# Patient Record
Sex: MALE | Race: WHITE | NOT HISPANIC OR LATINO | Employment: UNEMPLOYED | URBAN - METROPOLITAN AREA
[De-identification: names, ages, dates, MRNs, and addresses within clinical notes are randomized per-mention and may not be internally consistent; named-entity substitution may affect disease eponyms.]

---

## 2024-01-24 ENCOUNTER — APPOINTMENT (OUTPATIENT)
Dept: CARDIOLOGY | Facility: HOSPITAL | Age: 39
End: 2024-01-24
Payer: MEDICAID

## 2024-01-24 ENCOUNTER — HOSPITAL ENCOUNTER (EMERGENCY)
Facility: HOSPITAL | Age: 39
Discharge: PSYCHIATRIC OR SUBSTANCE USE FACILITY | End: 2024-01-27
Attending: EMERGENCY MEDICINE
Payer: MEDICAID

## 2024-01-24 DIAGNOSIS — F23 ACUTE PSYCHOSIS (MULTI): Primary | ICD-10-CM

## 2024-01-24 LAB
ALBUMIN SERPL BCP-MCNC: 4.2 G/DL (ref 3.4–5)
ALP SERPL-CCNC: 61 U/L (ref 33–120)
ALT SERPL W P-5'-P-CCNC: 9 U/L (ref 10–52)
AMPHETAMINES UR QL SCN: ABNORMAL
ANION GAP SERPL CALC-SCNC: 12 MMOL/L (ref 10–20)
APAP SERPL-MCNC: <10 UG/ML
AST SERPL W P-5'-P-CCNC: 15 U/L (ref 9–39)
BARBITURATES UR QL SCN: ABNORMAL
BASOPHILS # BLD AUTO: 0.04 X10*3/UL (ref 0–0.1)
BASOPHILS NFR BLD AUTO: 0.5 %
BENZODIAZ UR QL SCN: ABNORMAL
BILIRUB SERPL-MCNC: 0.5 MG/DL (ref 0–1.2)
BUN SERPL-MCNC: 20 MG/DL (ref 6–23)
BZE UR QL SCN: ABNORMAL
CALCIUM SERPL-MCNC: 8.8 MG/DL (ref 8.6–10.3)
CANNABINOIDS UR QL SCN: ABNORMAL
CHLORIDE SERPL-SCNC: 99 MMOL/L (ref 98–107)
CO2 SERPL-SCNC: 27 MMOL/L (ref 21–32)
CREAT SERPL-MCNC: 0.81 MG/DL (ref 0.5–1.3)
EGFRCR SERPLBLD CKD-EPI 2021: >90 ML/MIN/1.73M*2
EOSINOPHIL # BLD AUTO: 0.1 X10*3/UL (ref 0–0.7)
EOSINOPHIL NFR BLD AUTO: 1.4 %
ERYTHROCYTE [DISTWIDTH] IN BLOOD BY AUTOMATED COUNT: 13.5 % (ref 11.5–14.5)
ETHANOL SERPL-MCNC: <10 MG/DL
FENTANYL+NORFENTANYL UR QL SCN: ABNORMAL
GLUCOSE SERPL-MCNC: 122 MG/DL (ref 74–99)
HCT VFR BLD AUTO: 42.6 % (ref 41–52)
HGB BLD-MCNC: 14 G/DL (ref 13.5–17.5)
IMM GRANULOCYTES # BLD AUTO: 0.02 X10*3/UL (ref 0–0.7)
IMM GRANULOCYTES NFR BLD AUTO: 0.3 % (ref 0–0.9)
LYMPHOCYTES # BLD AUTO: 1.64 X10*3/UL (ref 1.2–4.8)
LYMPHOCYTES NFR BLD AUTO: 22.3 %
MCH RBC QN AUTO: 31 PG (ref 26–34)
MCHC RBC AUTO-ENTMCNC: 32.9 G/DL (ref 32–36)
MCV RBC AUTO: 95 FL (ref 80–100)
MONOCYTES # BLD AUTO: 0.7 X10*3/UL (ref 0.1–1)
MONOCYTES NFR BLD AUTO: 9.5 %
NEUTROPHILS # BLD AUTO: 4.85 X10*3/UL (ref 1.2–7.7)
NEUTROPHILS NFR BLD AUTO: 66 %
NRBC BLD-RTO: 0 /100 WBCS (ref 0–0)
OPIATES UR QL SCN: ABNORMAL
OXYCODONE+OXYMORPHONE UR QL SCN: ABNORMAL
PCP UR QL SCN: ABNORMAL
PLATELET # BLD AUTO: 319 X10*3/UL (ref 150–450)
POTASSIUM SERPL-SCNC: 4 MMOL/L (ref 3.5–5.3)
PROT SERPL-MCNC: 6.8 G/DL (ref 6.4–8.2)
RBC # BLD AUTO: 4.51 X10*6/UL (ref 4.5–5.9)
SALICYLATES SERPL-MCNC: <3 MG/DL
SARS-COV-2 RNA RESP QL NAA+PROBE: NOT DETECTED
SODIUM SERPL-SCNC: 134 MMOL/L (ref 136–145)
TSH SERPL-ACNC: 0.62 MIU/L (ref 0.44–3.98)
WBC # BLD AUTO: 7.4 X10*3/UL (ref 4.4–11.3)

## 2024-01-24 PROCEDURE — 87502 INFLUENZA DNA AMP PROBE: CPT | Performed by: EMERGENCY MEDICINE

## 2024-01-24 PROCEDURE — 84443 ASSAY THYROID STIM HORMONE: CPT | Performed by: EMERGENCY MEDICINE

## 2024-01-24 PROCEDURE — 80143 DRUG ASSAY ACETAMINOPHEN: CPT | Performed by: EMERGENCY MEDICINE

## 2024-01-24 PROCEDURE — 85025 COMPLETE CBC W/AUTO DIFF WBC: CPT | Performed by: EMERGENCY MEDICINE

## 2024-01-24 PROCEDURE — 80053 COMPREHEN METABOLIC PANEL: CPT | Performed by: EMERGENCY MEDICINE

## 2024-01-24 PROCEDURE — 36415 COLL VENOUS BLD VENIPUNCTURE: CPT | Performed by: EMERGENCY MEDICINE

## 2024-01-24 PROCEDURE — 2500000002 HC RX 250 W HCPCS SELF ADMINISTERED DRUGS (ALT 637 FOR MEDICARE OP, ALT 636 FOR OP/ED): Performed by: EMERGENCY MEDICINE

## 2024-01-24 PROCEDURE — 99285 EMERGENCY DEPT VISIT HI MDM: CPT | Performed by: EMERGENCY MEDICINE

## 2024-01-24 PROCEDURE — S4991 NICOTINE PATCH NONLEGEND: HCPCS | Performed by: EMERGENCY MEDICINE

## 2024-01-24 PROCEDURE — 80307 DRUG TEST PRSMV CHEM ANLYZR: CPT | Performed by: EMERGENCY MEDICINE

## 2024-01-24 PROCEDURE — 93005 ELECTROCARDIOGRAM TRACING: CPT

## 2024-01-24 PROCEDURE — 87635 SARS-COV-2 COVID-19 AMP PRB: CPT | Performed by: EMERGENCY MEDICINE

## 2024-01-24 RX ORDER — IBUPROFEN 200 MG
1 TABLET ORAL DAILY
Status: DISCONTINUED | OUTPATIENT
Start: 2024-01-24 | End: 2024-01-27 | Stop reason: HOSPADM

## 2024-01-24 RX ADMIN — NICOTINE 1 PATCH: 14 PATCH, EXTENDED RELEASE TRANSDERMAL at 21:09

## 2024-01-24 ASSESSMENT — COLUMBIA-SUICIDE SEVERITY RATING SCALE - C-SSRS
6. HAVE YOU EVER DONE ANYTHING, STARTED TO DO ANYTHING, OR PREPARED TO DO ANYTHING TO END YOUR LIFE?: NO
1. IN THE PAST MONTH, HAVE YOU WISHED YOU WERE DEAD OR WISHED YOU COULD GO TO SLEEP AND NOT WAKE UP?: NO
2. HAVE YOU ACTUALLY HAD ANY THOUGHTS OF KILLING YOURSELF?: NO

## 2024-01-24 ASSESSMENT — PAIN - FUNCTIONAL ASSESSMENT: PAIN_FUNCTIONAL_ASSESSMENT: 0-10
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
## 2024-01-24 NOTE — ED PROVIDER NOTES
HPI   Chief Complaint   Patient presents with   • Psychiatric Evaluation       Patient presents for a psychiatric evaluation.  He states that he believes he needs a psychiatric evaluation.  Significant other is here and is concerned because he believes that his family is out to get him.  He is very paranoid about this.  The police have been to the house multiple times because he calls them.  He has not noted any specific threats.  He had an intake evaluation at [place] but no other psychiatric evaluations.  He is on anxiety medications and Adderall at home.  He denies being suicidal.  He denies any auditory or visual hallucinations just the paranoia.  He admits to drinking alcohol and smoking marijuana but denies any methamphetamine or any other hard drug use.                          [place] Coma Scale Score: 15                  Patient History   No past medical history on file.  No past surgical history on file.  No family history on file.  Social History     Tobacco Use   • Smoking status: Not on file   • Smokeless tobacco: Not on file   Substance Use Topics   • Alcohol use: Not on file   • Drug use: Not on file       Physical Exam   ED Triage Vitals [01/24/24 1158]   Temperature Heart Rate Respirations BP   36.4 °C (97.6 °F) 96 18 123/85      Pulse Ox Temp src Heart Rate Source Patient Position   99 % -- -- Lying      BP Location FiO2 (%)     -- --       Physical Exam  Vitals and nursing note reviewed.   Constitutional:       Appearance: Normal appearance.   HENT:      Head: Normocephalic and atraumatic.      Mouth/Throat:      Mouth: Mucous membranes are moist.   Eyes:      Extraocular Movements: Extraocular movements intact.      Pupils: Pupils are equal, round, and reactive to light.   Cardiovascular:      Rate and Rhythm: Normal rate and regular rhythm.      Heart sounds: No murmur heard.  Pulmonary:      Effort: Pulmonary effort is normal. No respiratory distress.      Breath sounds: Normal breath sounds.    Abdominal:      General: There is no distension.      Palpations: Abdomen is soft.      Tenderness: There is no abdominal tenderness.   Musculoskeletal:         General: No deformity. Normal range of motion.   Skin:     General: Skin is warm and dry.      Findings: No lesion or rash.   Neurological:      General: No focal deficit present.      Mental Status: He is alert and oriented to person, place, and time.      Sensory: No sensory deficit.      Motor: No weakness.   Psychiatric:         Thought Content: Thought content is paranoid. Thought content does not include homicidal or suicidal ideation.       Labs Reviewed - No data to display  No orders to display     ED Course & MDM   Diagnoses as of 01/24/24 1932   Acute psychosis (CMS/[facility])       Medical Decision Making  Differentials include psychiatric disease, metabolic issue, drug-induced.  Imaging studies, if performed, were independently reviewed and interpreted by myself and confirmed by radiologist. EKG(s), if performed, were interpreted by myself. The patient had a screening EKG that was sinus rhythm at a rate of 84.  No acute ischemia.  QTc 434, ID interval 123.  Screening labs show sodium of 134, otherwise unremarkable.  Toxicology showed amphetamines and cannabinoids.  The patient is medically cleared for psychiatric evaluation and admission.  Patient was evaluated by the [facility] evaluator and we both feel the patient requires inpatient stabilization.  He will likely need to go to [facility] due to not having insurance.  We are still awaiting acceptance for there.        Procedure  Procedures     Yeyo Rowe MD  01/24/24 1932
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
## 2024-01-25 LAB
AMORPH CRY #/AREA UR COMP ASSIST: ABNORMAL /HPF
APPEARANCE UR: ABNORMAL
BACTERIA #/AREA URNS AUTO: ABNORMAL /HPF
BILIRUB UR STRIP.AUTO-MCNC: NEGATIVE MG/DL
COLOR UR: YELLOW
GLUCOSE UR STRIP.AUTO-MCNC: NEGATIVE MG/DL
KETONES UR STRIP.AUTO-MCNC: NEGATIVE MG/DL
LEUKOCYTE ESTERASE UR QL STRIP.AUTO: ABNORMAL
MUCOUS THREADS #/AREA URNS AUTO: ABNORMAL /LPF
NITRITE UR QL STRIP.AUTO: NEGATIVE
PH UR STRIP.AUTO: 8 [PH]
PROT UR STRIP.AUTO-MCNC: NEGATIVE MG/DL
RBC # UR STRIP.AUTO: NEGATIVE /UL
RBC #/AREA URNS AUTO: ABNORMAL /HPF
SP GR UR STRIP.AUTO: 1.01
SQUAMOUS #/AREA URNS AUTO: ABNORMAL /HPF
UROBILINOGEN UR STRIP.AUTO-MCNC: <2 MG/DL
WBC #/AREA URNS AUTO: ABNORMAL /HPF

## 2024-01-25 PROCEDURE — 81001 URINALYSIS AUTO W/SCOPE: CPT | Performed by: STUDENT IN AN ORGANIZED HEALTH CARE EDUCATION/TRAINING PROGRAM

## 2024-01-25 PROCEDURE — 2500000001 HC RX 250 WO HCPCS SELF ADMINISTERED DRUGS (ALT 637 FOR MEDICARE OP): Performed by: STUDENT IN AN ORGANIZED HEALTH CARE EDUCATION/TRAINING PROGRAM

## 2024-01-25 PROCEDURE — S4991 NICOTINE PATCH NONLEGEND: HCPCS | Performed by: EMERGENCY MEDICINE

## 2024-01-25 PROCEDURE — 2500000002 HC RX 250 W HCPCS SELF ADMINISTERED DRUGS (ALT 637 FOR MEDICARE OP, ALT 636 FOR OP/ED): Performed by: EMERGENCY MEDICINE

## 2024-01-25 RX ORDER — LEVOTHYROXINE SODIUM 50 UG/1
50 TABLET ORAL DAILY
Status: DISCONTINUED | OUTPATIENT
Start: 2024-01-25 | End: 2024-01-27 | Stop reason: HOSPADM

## 2024-01-25 RX ORDER — LISINOPRIL 10 MG/1
10 TABLET ORAL DAILY
Status: DISCONTINUED | OUTPATIENT
Start: 2024-01-25 | End: 2024-01-27 | Stop reason: HOSPADM

## 2024-01-25 RX ORDER — FLUOXETINE HYDROCHLORIDE 20 MG/1
20 CAPSULE ORAL
COMMUNITY
Start: 2024-01-05 | End: 2024-01-31 | Stop reason: HOSPADM

## 2024-01-25 RX ORDER — LEVOTHYROXINE SODIUM 50 UG/1
50 TABLET ORAL
COMMUNITY
Start: 2023-04-28 | End: 2024-01-31 | Stop reason: HOSPADM

## 2024-01-25 RX ORDER — FLUOXETINE HYDROCHLORIDE 20 MG/1
20 CAPSULE ORAL DAILY
Status: DISCONTINUED | OUTPATIENT
Start: 2024-01-25 | End: 2024-01-27 | Stop reason: HOSPADM

## 2024-01-25 RX ORDER — TRAZODONE HYDROCHLORIDE 50 MG/1
25 TABLET ORAL NIGHTLY
Status: DISCONTINUED | OUTPATIENT
Start: 2024-01-25 | End: 2024-01-27 | Stop reason: DRUGHIGH

## 2024-01-25 RX ORDER — LISINOPRIL 10 MG/1
10 TABLET ORAL
COMMUNITY
Start: 2023-04-28 | End: 2024-01-31 | Stop reason: HOSPADM

## 2024-01-25 RX ORDER — DEXTROAMPHETAMINE SACCHARATE, AMPHETAMINE ASPARTATE, DEXTROAMPHETAMINE SULFATE AND AMPHETAMINE SULFATE 7.5; 7.5; 7.5; 7.5 MG/1; MG/1; MG/1; MG/1
30 TABLET ORAL 2 TIMES DAILY
COMMUNITY
Start: 2019-03-11 | End: 2024-01-31 | Stop reason: HOSPADM

## 2024-01-25 RX ADMIN — FLUOXETINE HYDROCHLORIDE 20 MG: 20 CAPSULE ORAL at 16:06

## 2024-01-25 RX ADMIN — LEVOTHYROXINE SODIUM 50 MCG: 50 TABLET ORAL at 16:07

## 2024-01-25 RX ADMIN — NICOTINE 1 PATCH: 14 PATCH, EXTENDED RELEASE TRANSDERMAL at 12:49

## 2024-01-25 RX ADMIN — TRAZODONE HYDROCHLORIDE 25 MG: 50 TABLET ORAL at 20:16

## 2024-01-25 RX ADMIN — LISINOPRIL 10 MG: 10 TABLET ORAL at 16:07

## 2024-01-25 SDOH — HEALTH STABILITY: MENTAL HEALTH: BEHAVIORS/MOOD: COOPERATIVE;CALM

## 2024-01-25 SDOH — HEALTH STABILITY: MENTAL HEALTH: BEHAVIORS/MOOD: APPROPRIATE FOR AGE;APPROPRIATE FOR SITUATION

## 2024-01-25 SDOH — HEALTH STABILITY: MENTAL HEALTH: BEHAVIORS/MOOD: CALM;COOPERATIVE

## 2024-01-25 SDOH — HEALTH STABILITY: MENTAL HEALTH: BEHAVIORS/MOOD: CALM

## 2024-01-25 SDOH — HEALTH STABILITY: MENTAL HEALTH: CONTENT: UNREMARKABLE

## 2024-01-25 SDOH — HEALTH STABILITY: MENTAL HEALTH: SLEEP PATTERN: DIFFICULTY FALLING ASLEEP

## 2024-01-25 SDOH — HEALTH STABILITY: MENTAL HEALTH

## 2024-01-25 SDOH — HEALTH STABILITY: MENTAL HEALTH: BEHAVIORS/MOOD: COOPERATIVE;CALM;MANIPULATIVE

## 2024-01-25 ASSESSMENT — COLUMBIA-SUICIDE SEVERITY RATING SCALE - C-SSRS
6. HAVE YOU EVER DONE ANYTHING, STARTED TO DO ANYTHING, OR PREPARED TO DO ANYTHING TO END YOUR LIFE?: NO
1. SINCE LAST CONTACT, HAVE YOU WISHED YOU WERE DEAD OR WISHED YOU COULD GO TO SLEEP AND NOT WAKE UP?: NO
2. HAVE YOU ACTUALLY HAD ANY THOUGHTS OF KILLING YOURSELF?: NO

## 2024-01-26 PROCEDURE — 2500000001 HC RX 250 WO HCPCS SELF ADMINISTERED DRUGS (ALT 637 FOR MEDICARE OP): Performed by: STUDENT IN AN ORGANIZED HEALTH CARE EDUCATION/TRAINING PROGRAM

## 2024-01-26 PROCEDURE — 2500000002 HC RX 250 W HCPCS SELF ADMINISTERED DRUGS (ALT 637 FOR MEDICARE OP, ALT 636 FOR OP/ED): Performed by: EMERGENCY MEDICINE

## 2024-01-26 PROCEDURE — 99222 1ST HOSP IP/OBS MODERATE 55: CPT

## 2024-01-26 PROCEDURE — S4991 NICOTINE PATCH NONLEGEND: HCPCS | Performed by: EMERGENCY MEDICINE

## 2024-01-26 RX ADMIN — LISINOPRIL 10 MG: 10 TABLET ORAL at 09:03

## 2024-01-26 RX ADMIN — NICOTINE 1 PATCH: 14 PATCH, EXTENDED RELEASE TRANSDERMAL at 09:04

## 2024-01-26 RX ADMIN — LEVOTHYROXINE SODIUM 50 MCG: 50 TABLET ORAL at 09:03

## 2024-01-26 RX ADMIN — TRAZODONE HYDROCHLORIDE 25 MG: 50 TABLET ORAL at 21:22

## 2024-01-26 RX ADMIN — FLUOXETINE HYDROCHLORIDE 20 MG: 20 CAPSULE ORAL at 09:03

## 2024-01-26 SDOH — HEALTH STABILITY: MENTAL HEALTH: BEHAVIORS/MOOD: CALM;COOPERATIVE

## 2024-01-26 SDOH — HEALTH STABILITY: MENTAL HEALTH: CONTENT: UNREMARKABLE

## 2024-01-26 SDOH — HEALTH STABILITY: MENTAL HEALTH

## 2024-01-26 SDOH — HEALTH STABILITY: MENTAL HEALTH: BEHAVIORS/MOOD: CALM;SLEEPING

## 2024-01-26 SDOH — HEALTH STABILITY: MENTAL HEALTH: BEHAVIORS/MOOD: SLEEPING

## 2024-01-26 SDOH — SOCIAL STABILITY: SOCIAL INSECURITY: FAMILY BEHAVIORS: CALM

## 2024-01-26 ASSESSMENT — LIFESTYLE VARIABLES
HAVE PEOPLE ANNOYED YOU BY CRITICIZING YOUR DRINKING: NO
EVER HAD A DRINK FIRST THING IN THE MORNING TO STEADY YOUR NERVES TO GET RID OF A HANGOVER: NO
REASON UNABLE TO ASSESS: NO
EVER FELT BAD OR GUILTY ABOUT YOUR DRINKING: NO
HAVE YOU EVER FELT YOU SHOULD CUT DOWN ON YOUR DRINKING: NO

## 2024-01-26 NOTE — PROGRESS NOTES
I received patient in signout in summary this is a 38-year-old male I have accept care of this patient in signout.    In summary:  I have seen the patient in signout this is a 38-year-old male past medical history of schizophrenia who is presenting for psychosis medically cleared and is pending placement.      Labs Reviewed   COMPREHENSIVE METABOLIC PANEL - Abnormal       Result Value    Glucose 122 (*)     Sodium 134 (*)     Potassium 4.0      Chloride 99      Bicarbonate 27      Anion Gap 12      Urea Nitrogen 20      Creatinine 0.81      eGFR >90      Calcium 8.8      Albumin 4.2      Alkaline Phosphatase 61      Total Protein 6.8      AST 15      Bilirubin, Total 0.5      ALT 9 (*)    DRUG SCREEN,URINE - Abnormal    Amphetamine Screen, Urine Presumptive Positive (*)     Barbiturate Screen, Urine Presumptive Negative      Benzodiazepines Screen, Urine Presumptive Negative      Cannabinoid Screen, Urine Presumptive Positive (*)     Cocaine Metabolite Screen, Urine Presumptive Negative      Fentanyl Screen, Urine Presumptive Negative      Opiate Screen, Urine Presumptive Negative      Oxycodone Screen, Urine Presumptive Negative      PCP Screen, Urine Presumptive Negative      Narrative:     Drug screen results are presumptive and should not be used to assess   compliance with prescribed medication. Contact the performing [facility] laboratory   to add-on definitive confirmatory testing if clinically indicated.    Toxicology screening results are reported qualitatively. The concentration must   be greater than or equal to the cutoff to be reported as positive. The concentration   at which the screening test can detect an individual drug or metabolite varies.   The absence of expected drug(s) and/or drug metabolite(s) may indicate non-compliance,   inappropriate timing of specimen collection relative to drug administration, poor drug   absorption, diluted/adulterated urine, or limitations of testing. For medical purposes    only; not valid for forensic use.    Interpretive questions should be directed to the laboratory medical directors.   URINALYSIS WITH REFLEX MICROSCOPIC - Abnormal    Color, Urine Yellow      Appearance, Urine Hazy (*)     Specific Gravity, Urine 1.014      pH, Urine 8.0      Protein, Urine NEGATIVE      Glucose, Urine NEGATIVE      Blood, Urine NEGATIVE      Ketones, Urine NEGATIVE      Bilirubin, Urine NEGATIVE      Urobilinogen, Urine <2.0      Nitrite, Urine NEGATIVE      Leukocyte Esterase, Urine TRACE (*)    MICROSCOPIC ONLY, URINE - Abnormal    WBC, Urine 21-50 (*)     RBC, Urine NONE      Squamous Epithelial Cells, Urine 1-9 (SPARSE)      Bacteria, Urine 1+ (*)     Mucus, Urine 1+      Amorphous Crystals, Urine 2+     ACUTE TOXICOLOGY PANEL, BLOOD - Normal    Acetaminophen <10.0      Salicylate  <3      Alcohol <10     TSH WITH REFLEX TO FREE T4 IF ABNORMAL - Normal    Thyroid Stimulating Hormone 0.62      Narrative:     TSH testing is performed using different testing methodology at [facility] [place] [facility] than at [facility]. Direct result comparisons should only be made within the same method.     SARS-COV-2 PCR, SCREEN ASYMPTOMATIC - Normal    Coronavirus 2019, PCR Not Detected      Narrative:     This assay has received FDA Emergency Use Authorization (EUA) and is only authorized for the duration of time that circumstances exist to justify the authorization of the emergency use of in vitro diagnostic tests for the detection of SARS-CoV-2 virus and/or diagnosis of COVID-19 infection under section 564(b)(1) of the Act, 21 U.S.C. 360bbb-3(b)(1). This assay is an in vitro diagnostic nucleic acid amplification test for the qualitative detection of SARS-CoV-2 from nasopharyngeal specimens and has been validated for use at [facility]. Negative results do not preclude COVID-19 infections and should not be used as the sole basis for diagnosis, treatment, or other management  decisions.     CBC WITH AUTO DIFFERENTIAL    WBC 7.4      nRBC 0.0      RBC 4.51      Hemoglobin 14.0      Hematocrit 42.6      MCV 95      MCH 31.0      MCHC 32.9      RDW 13.5      Platelets 319      Neutrophils % 66.0      Immature Granulocytes %, Automated 0.3      Lymphocytes % 22.3      Monocytes % 9.5      Eosinophils % 1.4      Basophils % 0.5      Neutrophils Absolute 4.85      Immature Granulocytes Absolute, Automated 0.02      Lymphocytes Absolute 1.64      Monocytes Absolute 0.70      Eosinophils Absolute 0.10      Basophils Absolute 0.04       No orders to display

## 2024-01-26 NOTE — PROGRESS NOTES
.  Patient was signed over to me by the outgoing physician.  Plan was for him to be going to [facility] but was 11 on the list the patient while here got his insurance sorted out and now has more options to be able to be admitted to other inpatient psychiatric facilities he therefore is pending EPAT evaluation with plans for inpatient psychiatric placement he has been stable during my shift and has not required any acute interventions

## 2024-01-26 NOTE — CONSULTS
Consults  Referring Provider  Yeyo Rowe MD    History Of Present Illness  Teo Acevedo is a 38 y.o. male presenting with c/c of wanting a psychiatric consult due to believing that his family is out to get him. UDS (+) cannabis and (+) amphetamines, BAL < 10mg/dL.     Past Medical History  Per chart ADD  Per pt depression    Surgical History  He has no past surgical history on file.     Social History  Has a history of alcohol use and marijuana use.     Past Psychiatric History     Prior psychiatric hospitalizations: Denies  Prior rehab/detox: Denies  History of suicide attempts: Denies history of attempts; endorses passive SI without a plan  History of self-harm: Denies  History of trauma/abuse/loss: Denies  History of violence: Denies    Current psychiatric medications: Adderall 30mg/day for ADD, Prozac 20mg/day for depression and anxiety  Past psychiatric medications: Zoloft, unknown dose, in his 20's for depression, Trazadone 25mg for sleep    Family psychiatric history:     - Psychiatric disorders: unknown, pt states his family views mental health as a weakness     - Suicide: denies     - Substance use: pt is unaware of family history of substance use    Current living situation: lives alone in a home  Current employment/source of income:  currently unemployed  Born and raised: [facility]   Education: attended college but did not graduate  Legal history: denies  Access to weapons: 2 guns in the home per [place] Report    Allergies  Patient has no known allergies.    Review of Systems    Psychiatric ROS - Adult  Anxiety: General Anxiety Disorder (KENNEDI)KENNEDI Behaviors: difficult to control worry, excessive anxiety/worry, irritability, restlessness, and sleep disturbance  Depression: energy, sleep decreased , and suicidal thoughts  Delirium: negative  Psychosis: negative  Yecenia: negative  Safety Issues: thoughts of harm to others and passive death wish  Psychiatric ROS Comment: Pt denies HI, per chart from  "[facility] obtaining collateral from the girlfriend she expressed concerns for HI towards his family    Physical Exam    Mental Status Exam  General: 39 y/o  male, dressed in a hospital gown and sitting on a hospital cot  Appearance: pt appears to be stated age   Attitude: calm and cooperative, appears to be guarded   Behavior: maintains eye contact intermittently   Motor Activity: no PMAR/TD/EPS observed. Gait not assessed.  Speech: pt is speaking in a low tone/volume and slow rate/rhythm   Mood: \"I feel like I've been depressed\"   Affect: hypothymic and flat   Thought Process: linear, organized   Thought Content: Denies HI. Expresses SI as a fleeting thought with no plan or past attempts. (+) paranoid delusions of family members \"out to get him\"  Thought Perception: Denies AVH. Does not appear to be responding to internal stimuli.   Cognition: Alert and orientated x3. Recent memory appears to be intact.  Insight: fair  Judgement: poor; pt appears to be minimizing the situation by denying HI towards family members. Recent coping skills included \"drinking until I blacked out\".    Psychiatric Risk Assessment  Violence Risk Assessment: per collateral clearly identified target, homicidal ideation, and male  Acute Risk of Harm to Others is Considered: moderate and high   Suicide Risk Assessment: , living alone or lack of social support, male, suicidal ideations, and unmarried  Protective Factors against Suicide: adherence to  treatment and fear of suicide  Acute Risk of Harm to Self is Considered: moderate/high    Last Recorded Vitals  Blood pressure 137/88, pulse 78, temperature 36.8 °C (98.3 °F), temperature source Temporal, resp. rate 16, height 1.829 m (6'), weight 72.6 kg (160 lb), SpO2 98 %.    Relevant Results    Scheduled medications  FLUoxetine, 20 mg, oral, Daily  levothyroxine, 50 mcg, oral, Daily  lisinopril, 10 mg, oral, Daily  nicotine, 1 patch, transdermal, Daily  traZODone, 25 mg, oral, " Nightly      Continuous medications     PRN medications  Results for orders placed or performed during the hospital encounter of 01/24/24 (from the past 96 hour(s))   Electrocardiogram, 12-lead   Result Value Ref Range    Ventricular Rate 84 BPM    Atrial Rate 84 BPM    OK Interval 123 ms    QRS Duration 87 ms    QT Interval 367 ms    QTC Calculation(Bazett) 434 ms    P Axis 61 degrees    R Axis 80 degrees    T Axis 73 degrees    QRS Count 14 beats    Q Onset 252 ms    T Offset 436 ms    QTC Fredericia 410 ms   CBC and Auto Differential   Result Value Ref Range    WBC 7.4 4.4 - 11.3 x10*3/uL    nRBC 0.0 0.0 - 0.0 /100 WBCs    RBC 4.51 4.50 - 5.90 x10*6/uL    Hemoglobin 14.0 13.5 - 17.5 g/dL    Hematocrit 42.6 41.0 - 52.0 %    MCV 95 80 - 100 fL    MCH 31.0 26.0 - 34.0 pg    MCHC 32.9 32.0 - 36.0 g/dL    RDW 13.5 11.5 - 14.5 %    Platelets 319 150 - 450 x10*3/uL    Neutrophils % 66.0 40.0 - 80.0 %    Immature Granulocytes %, Automated 0.3 0.0 - 0.9 %    Lymphocytes % 22.3 13.0 - 44.0 %    Monocytes % 9.5 2.0 - 10.0 %    Eosinophils % 1.4 0.0 - 6.0 %    Basophils % 0.5 0.0 - 2.0 %    Neutrophils Absolute 4.85 1.20 - 7.70 x10*3/uL    Immature Granulocytes Absolute, Automated 0.02 0.00 - 0.70 x10*3/uL    Lymphocytes Absolute 1.64 1.20 - 4.80 x10*3/uL    Monocytes Absolute 0.70 0.10 - 1.00 x10*3/uL    Eosinophils Absolute 0.10 0.00 - 0.70 x10*3/uL    Basophils Absolute 0.04 0.00 - 0.10 x10*3/uL   Comprehensive Metabolic Panel   Result Value Ref Range    Glucose 122 (H) 74 - 99 mg/dL    Sodium 134 (L) 136 - 145 mmol/L    Potassium 4.0 3.5 - 5.3 mmol/L    Chloride 99 98 - 107 mmol/L    Bicarbonate 27 21 - 32 mmol/L    Anion Gap 12 10 - 20 mmol/L    Urea Nitrogen 20 6 - 23 mg/dL    Creatinine 0.81 0.50 - 1.30 mg/dL    eGFR >90 >60 mL/min/1.73m*2    Calcium 8.8 8.6 - 10.3 mg/dL    Albumin 4.2 3.4 - 5.0 g/dL    Alkaline Phosphatase 61 33 - 120 U/L    Total Protein 6.8 6.4 - 8.2 g/dL    AST 15 9 - 39 U/L    Bilirubin, Total  0.5 0.0 - 1.2 mg/dL    ALT 9 (L) 10 - 52 U/L   Acute Toxicology Panel, Blood   Result Value Ref Range    Acetaminophen <10.0 10.0 - 30.0 ug/mL    Salicylate  <3 4 - 20 mg/dL    Alcohol <10 <=10 mg/dL   TSH with reflex to Free T4 if abnormal   Result Value Ref Range    Thyroid Stimulating Hormone 0.62 0.44 - 3.98 mIU/L   Drug Screen, Urine   Result Value Ref Range    Amphetamine Screen, Urine Presumptive Positive (A) Presumptive Negative    Barbiturate Screen, Urine Presumptive Negative Presumptive Negative    Benzodiazepines Screen, Urine Presumptive Negative Presumptive Negative    Cannabinoid Screen, Urine Presumptive Positive (A) Presumptive Negative    Cocaine Metabolite Screen, Urine Presumptive Negative Presumptive Negative    Fentanyl Screen, Urine Presumptive Negative Presumptive Negative    Opiate Screen, Urine Presumptive Negative Presumptive Negative    Oxycodone Screen, Urine Presumptive Negative Presumptive Negative    PCP Screen, Urine Presumptive Negative Presumptive Negative   Sars-CoV-2 PCR, Screen Asymptomatic   Result Value Ref Range    Coronavirus 2019, PCR Not Detected Not Detected   Urinalysis with Reflex Microscopic   Result Value Ref Range    Color, Urine Yellow Straw, Yellow    Appearance, Urine Hazy (N) Clear    Specific Gravity, Urine 1.014 1.005 - 1.035    pH, Urine 8.0 5.0, 5.5, 6.0, 6.5, 7.0, 7.5, 8.0    Protein, Urine NEGATIVE NEGATIVE mg/dL    Glucose, Urine NEGATIVE NEGATIVE mg/dL    Blood, Urine NEGATIVE NEGATIVE    Ketones, Urine NEGATIVE NEGATIVE mg/dL    Bilirubin, Urine NEGATIVE NEGATIVE    Urobilinogen, Urine <2.0 <2.0 mg/dL    Nitrite, Urine NEGATIVE NEGATIVE    Leukocyte Esterase, Urine TRACE (A) NEGATIVE   Microscopic Only, Urine   Result Value Ref Range    WBC, Urine 21-50 (A) 1-5, NONE /HPF    RBC, Urine NONE NONE, 1-2, 3-5 /HPF    Squamous Epithelial Cells, Urine 1-9 (SPARSE) Reference range not established. /HPF    Bacteria, Urine 1+ (A) NONE SEEN /HPF    Mucus, Urine 1+  "Reference range not established. /LPF    Amorphous Crystals, Urine 2+ NONE, 1+, 2+ /HPF        Assessment/Plan     Pt is able to confirm name/date of birth and able to consent for the interview.     Pt is sitting up right on the hospital cot wearing a hospital gown. He states that he came to the ED for a psychiatric evaluation and that his girlfriend was concerned for him. He states that last week he attempted to receiving mental health services through [facility] outpatient services due to concerns about his relationship with his family. At the time, he states he was not medically insured; therefore, he filed out paperwork at the outpatient service center and was advised to wait for [facility] to reach out with the next steps. During this waiting period, he explains that he started drinking to \"pass the time\". He explains that he has a drinking history and became sober in July of 2022. As of recently, he states that he is having some family dysfunction which has lead to him using drinking as a coping mechanism. He states he started drinking Saturday (1/20) when his girlfriend grew concerned. He states he was drinking \"to black out\" as a way to \"pass the time\". He denies getting angry or irritable with drinking. At that time, the girlfriend advised him to \"get some help\" and brought him into the ED at [facility].     At this time, the patient is denying HI. He states that the dysfunction with his family has been ongoing \"since I can remember\". He believes they use him as the scapegoat. In the last year, the patient explains that he attempted to work on his relationship with his family by attending family counseling. During the session, he states that issues became worse and that nothing was getting resolved. At that point, the patient felt that he should \"cut them out\" because \"it makes me depressed\". He denies having thoughts of harming his family members or others. He explains his current symptoms of depression as " "having trouble sleeping and his mood is \"down\". He states he was started on Prozac by his PCP recently and was increased to 20mg/day after a month of taking the medication. He believes this has helped with his sleep and \"feeling better\".  He also states that he was prescribed trazadone 25mg/PRN for sleep but it did not improve his sleep; therefore, he does not take it. He explains that although the medication was working, his family makes his depressed and \"I can't get away from it\".     Per [facility]'s evaluation, they were able to obtain collaterals from the pt's girlfriend in person at his time of admission to [facility] ED. In their reports, the gf expressed concern for the family members safety stating that he has sent multiple homicidal texts to his family including threats such as \"I want to slit your throat\". The girlfriend recalls that in July 2022 the pt \"sobered up\" causing the pt to have clear memories about past events with his family. She also explained that he is paranoid about his family, stating that in May 2023 the pt was fired from his job after starting an argument with his boss but the pt believed the family sabotaged him because \"they were upset that I quit drinking\". She also reported that he has \"fits of anger\" and gets agitated over inconsequential things.  The gf also states that the pt expressed that he is drinking in hopes that it will kill him.    The patient appears to be minimizing the current situation by denying current HI/SI and fixating on when he will be allowed to go home. At this time, he is considered a moderate/high risk of harm to others due to recent threats directed at this family members. The patient is also considered a moderate/high risk of harm to himself due to making suicidal statements to his gf. I believe his judgement is poor due to recent SI/HI and utilizing drinking as a coping skill.    Impression  Unspecified Mood Disorder    RECOMMENDATIONS  - patient DOES " "currently meet criteria for inpatient psychiatric hospitalization; EPAT working on placement  - Issue Application for Emergency Admission (pink slip) only after patient is accepted to an inpatient psychiatric unit and is ready to be discharged. Search “Application for Emergency Admission” under SmartText.”  - Patient lacks the capacity to leave AMA at this time and thus cannot leave AMA. Call [facility] if patient attempts to leave AMA.  - To evaluate decision-making capacity, recommend use of the Capacity Evaluation Tool. Search “ IP Capacity Evaluation under SmartText\" unless the patient has a legal guardian, in which case all decisions per the legal guardian.  - Patient DOES require a 1:1 sitter from a psychiatric perspective at this time.  - Would secure all personal possessions and keep clad in hospital gown    I discussed these recommendations with the current ED provider (Dr. Dinero) who was in agreement with above plan of care.    Medication Consent  Medication Consent: n/a; consult service    Connie Mathew        "

## 2024-01-27 ENCOUNTER — HOSPITAL ENCOUNTER (INPATIENT)
Facility: HOSPITAL | Age: 39
LOS: 4 days | Discharge: HOME | End: 2024-01-31
Attending: PSYCHIATRY & NEUROLOGY | Admitting: PSYCHIATRY & NEUROLOGY
Payer: MEDICAID

## 2024-01-27 VITALS
SYSTOLIC BLOOD PRESSURE: 125 MMHG | OXYGEN SATURATION: 97 % | HEART RATE: 75 BPM | TEMPERATURE: 98.2 F | BODY MASS INDEX: 21.67 KG/M2 | HEIGHT: 72 IN | WEIGHT: 160 LBS | RESPIRATION RATE: 16 BRPM | DIASTOLIC BLOOD PRESSURE: 88 MMHG

## 2024-01-27 DIAGNOSIS — F17.220 CHEWING TOBACCO NICOTINE DEPENDENCE WITHOUT COMPLICATION: ICD-10-CM

## 2024-01-27 DIAGNOSIS — I10 PRIMARY HYPERTENSION: ICD-10-CM

## 2024-01-27 DIAGNOSIS — E03.9 ACQUIRED HYPOTHYROIDISM: ICD-10-CM

## 2024-01-27 DIAGNOSIS — F32.9 REACTIVE DEPRESSION: ICD-10-CM

## 2024-01-27 DIAGNOSIS — F90.0 ATTENTION DEFICIT HYPERACTIVITY DISORDER (ADHD), PREDOMINANTLY INATTENTIVE TYPE: Primary | ICD-10-CM

## 2024-01-27 PROBLEM — F32.A DEPRESSION: Status: ACTIVE | Noted: 2024-01-27

## 2024-01-27 LAB
FLUAV RNA RESP QL NAA+PROBE: NOT DETECTED
FLUBV RNA RESP QL NAA+PROBE: NOT DETECTED

## 2024-01-27 PROCEDURE — S4991 NICOTINE PATCH NONLEGEND: HCPCS | Performed by: EMERGENCY MEDICINE

## 2024-01-27 PROCEDURE — 2500000001 HC RX 250 WO HCPCS SELF ADMINISTERED DRUGS (ALT 637 FOR MEDICARE OP): Performed by: STUDENT IN AN ORGANIZED HEALTH CARE EDUCATION/TRAINING PROGRAM

## 2024-01-27 PROCEDURE — 2500000001 HC RX 250 WO HCPCS SELF ADMINISTERED DRUGS (ALT 637 FOR MEDICARE OP): Performed by: PSYCHIATRY & NEUROLOGY

## 2024-01-27 PROCEDURE — 99221 1ST HOSP IP/OBS SF/LOW 40: CPT | Performed by: NURSE PRACTITIONER

## 2024-01-27 PROCEDURE — 1240000001 HC SEMI-PRIVATE BH ROOM DAILY

## 2024-01-27 PROCEDURE — 2500000002 HC RX 250 W HCPCS SELF ADMINISTERED DRUGS (ALT 637 FOR MEDICARE OP, ALT 636 FOR OP/ED): Performed by: EMERGENCY MEDICINE

## 2024-01-27 RX ORDER — TRAZODONE HYDROCHLORIDE 50 MG/1
50 TABLET ORAL NIGHTLY
Status: DISCONTINUED | OUTPATIENT
Start: 2024-01-27 | End: 2024-01-27 | Stop reason: HOSPADM

## 2024-01-27 RX ORDER — LEVOTHYROXINE SODIUM 50 UG/1
50 TABLET ORAL
Status: DISCONTINUED | OUTPATIENT
Start: 2024-01-28 | End: 2024-01-27

## 2024-01-27 RX ORDER — IBUPROFEN 200 MG
1 TABLET ORAL DAILY PRN
Status: DISCONTINUED | OUTPATIENT
Start: 2024-01-28 | End: 2024-01-28

## 2024-01-27 RX ORDER — DIPHENHYDRAMINE HYDROCHLORIDE 50 MG/ML
50 INJECTION INTRAMUSCULAR; INTRAVENOUS ONCE AS NEEDED
Status: DISCONTINUED | OUTPATIENT
Start: 2024-01-27 | End: 2024-01-31 | Stop reason: HOSPADM

## 2024-01-27 RX ORDER — LEVOTHYROXINE SODIUM 50 UG/1
50 TABLET ORAL DAILY
Status: DISCONTINUED | OUTPATIENT
Start: 2024-01-28 | End: 2024-01-31 | Stop reason: HOSPADM

## 2024-01-27 RX ORDER — DIPHENHYDRAMINE HCL 50 MG
50 CAPSULE ORAL EVERY 6 HOURS PRN
Status: DISCONTINUED | OUTPATIENT
Start: 2024-01-27 | End: 2024-01-31 | Stop reason: HOSPADM

## 2024-01-27 RX ORDER — LISINOPRIL 5 MG/1
10 TABLET ORAL DAILY
Status: DISCONTINUED | OUTPATIENT
Start: 2024-01-28 | End: 2024-01-31 | Stop reason: HOSPADM

## 2024-01-27 RX ORDER — POLYETHYLENE GLYCOL 3350 17 G/17G
17 POWDER, FOR SOLUTION ORAL DAILY PRN
Status: DISCONTINUED | OUTPATIENT
Start: 2024-01-27 | End: 2024-01-31 | Stop reason: HOSPADM

## 2024-01-27 RX ORDER — TRAZODONE HYDROCHLORIDE 100 MG/1
100 TABLET ORAL NIGHTLY PRN
Status: DISCONTINUED | OUTPATIENT
Start: 2024-01-27 | End: 2024-01-28

## 2024-01-27 RX ORDER — FLUOXETINE HYDROCHLORIDE 20 MG/1
20 CAPSULE ORAL DAILY
Status: DISCONTINUED | OUTPATIENT
Start: 2024-01-28 | End: 2024-01-28

## 2024-01-27 RX ORDER — HALOPERIDOL 5 MG/1
5 TABLET ORAL EVERY 6 HOURS PRN
Status: DISCONTINUED | OUTPATIENT
Start: 2024-01-27 | End: 2024-01-31 | Stop reason: HOSPADM

## 2024-01-27 RX ORDER — LORAZEPAM 2 MG/ML
2 INJECTION INTRAMUSCULAR EVERY 6 HOURS PRN
Status: DISCONTINUED | OUTPATIENT
Start: 2024-01-27 | End: 2024-01-29

## 2024-01-27 RX ORDER — TRAZODONE HYDROCHLORIDE 50 MG/1
25 TABLET ORAL NIGHTLY
Status: DISCONTINUED | OUTPATIENT
Start: 2024-01-27 | End: 2024-01-27 | Stop reason: SDUPTHER

## 2024-01-27 RX ORDER — HALOPERIDOL 5 MG/ML
10 INJECTION INTRAMUSCULAR EVERY 6 HOURS PRN
Status: DISCONTINUED | OUTPATIENT
Start: 2024-01-27 | End: 2024-01-31 | Stop reason: HOSPADM

## 2024-01-27 RX ORDER — HALOPERIDOL 5 MG/ML
5 INJECTION INTRAMUSCULAR EVERY 6 HOURS PRN
Status: DISCONTINUED | OUTPATIENT
Start: 2024-01-27 | End: 2024-01-31 | Stop reason: HOSPADM

## 2024-01-27 RX ORDER — DEXTROAMPHETAMINE SACCHARATE, AMPHETAMINE ASPARTATE, DEXTROAMPHETAMINE SULFATE AND AMPHETAMINE SULFATE 2.5; 2.5; 2.5; 2.5 MG/1; MG/1; MG/1; MG/1
30 TABLET ORAL
Status: DISCONTINUED | OUTPATIENT
Start: 2024-01-28 | End: 2024-01-28

## 2024-01-27 RX ORDER — HALOPERIDOL 5 MG/1
10 TABLET ORAL EVERY 6 HOURS PRN
Status: DISCONTINUED | OUTPATIENT
Start: 2024-01-27 | End: 2024-01-31 | Stop reason: HOSPADM

## 2024-01-27 RX ORDER — TRAZODONE HYDROCHLORIDE 50 MG/1
25 TABLET ORAL ONCE
Status: DISCONTINUED | OUTPATIENT
Start: 2024-01-27 | End: 2024-01-27 | Stop reason: HOSPADM

## 2024-01-27 RX ORDER — IBUPROFEN 600 MG/1
600 TABLET ORAL EVERY 8 HOURS PRN
Status: DISCONTINUED | OUTPATIENT
Start: 2024-01-27 | End: 2024-01-31 | Stop reason: HOSPADM

## 2024-01-27 RX ORDER — LORAZEPAM 1 MG/1
2 TABLET ORAL EVERY 6 HOURS PRN
Status: DISCONTINUED | OUTPATIENT
Start: 2024-01-27 | End: 2024-01-29

## 2024-01-27 RX ADMIN — FLUOXETINE HYDROCHLORIDE 20 MG: 20 CAPSULE ORAL at 09:18

## 2024-01-27 RX ADMIN — LISINOPRIL 10 MG: 10 TABLET ORAL at 09:18

## 2024-01-27 RX ADMIN — NICOTINE 1 PATCH: 14 PATCH, EXTENDED RELEASE TRANSDERMAL at 09:00

## 2024-01-27 RX ADMIN — LEVOTHYROXINE SODIUM 50 MCG: 50 TABLET ORAL at 06:56

## 2024-01-27 RX ADMIN — TRAZODONE HYDROCHLORIDE 100 MG: 100 TABLET ORAL at 22:20

## 2024-01-27 SDOH — ECONOMIC STABILITY: INCOME INSECURITY: IN THE PAST 12 MONTHS, HAS THE ELECTRIC, GAS, OIL, OR WATER COMPANY THREATENED TO SHUT OFF SERVICE IN YOUR HOME?: NO

## 2024-01-27 SDOH — SOCIAL STABILITY: SOCIAL INSECURITY: WERE YOU ABLE TO COMPLETE ALL THE BEHAVIORAL HEALTH SCREENINGS?: YES

## 2024-01-27 SDOH — SOCIAL STABILITY: SOCIAL INSECURITY: HAS ANYONE EVER THREATENED TO HURT YOUR FAMILY OR YOUR PETS?: NO

## 2024-01-27 SDOH — ECONOMIC STABILITY: TRANSPORTATION INSECURITY
IN THE PAST 12 MONTHS, HAS THE LACK OF TRANSPORTATION KEPT YOU FROM MEDICAL APPOINTMENTS OR FROM GETTING MEDICATIONS?: NO

## 2024-01-27 SDOH — ECONOMIC STABILITY: HOUSING INSECURITY
IN THE LAST 12 MONTHS, WAS THERE A TIME WHEN YOU DID NOT HAVE A STEADY PLACE TO SLEEP OR SLEPT IN A SHELTER (INCLUDING NOW)?: NO

## 2024-01-27 SDOH — HEALTH STABILITY: MENTAL HEALTH: BEHAVIORS/MOOD: SLEEPING

## 2024-01-27 SDOH — SOCIAL STABILITY: SOCIAL INSECURITY: ARE YOU OR HAVE YOU BEEN THREATENED OR ABUSED PHYSICALLY, EMOTIONALLY, OR SEXUALLY BY ANYONE?: NO

## 2024-01-27 SDOH — ECONOMIC STABILITY: FOOD INSECURITY: WITHIN THE PAST 12 MONTHS, THE FOOD YOU BOUGHT JUST DIDN'T LAST AND YOU DIDN'T HAVE MONEY TO GET MORE.: NEVER TRUE

## 2024-01-27 SDOH — SOCIAL STABILITY: SOCIAL INSECURITY: ABUSE: ADULT

## 2024-01-27 SDOH — SOCIAL STABILITY: SOCIAL INSECURITY: HAVE YOU HAD THOUGHTS OF HARMING ANYONE ELSE?: NO

## 2024-01-27 SDOH — HEALTH STABILITY: PHYSICAL HEALTH: ON AVERAGE, HOW MANY DAYS PER WEEK DO YOU ENGAGE IN MODERATE TO STRENUOUS EXERCISE (LIKE A BRISK WALK)?: 0 DAYS

## 2024-01-27 SDOH — ECONOMIC STABILITY: INCOME INSECURITY: IN THE LAST 12 MONTHS, WAS THERE A TIME WHEN YOU WERE NOT ABLE TO PAY THE MORTGAGE OR RENT ON TIME?: NO

## 2024-01-27 SDOH — ECONOMIC STABILITY: INCOME INSECURITY: HOW HARD IS IT FOR YOU TO PAY FOR THE VERY BASICS LIKE FOOD, HOUSING, MEDICAL CARE, AND HEATING?: NOT HARD AT ALL

## 2024-01-27 SDOH — SOCIAL STABILITY: SOCIAL INSECURITY: DOES ANYONE TRY TO KEEP YOU FROM HAVING/CONTACTING OTHER FRIENDS OR DOING THINGS OUTSIDE YOUR HOME?: NO

## 2024-01-27 SDOH — ECONOMIC STABILITY: FOOD INSECURITY: WITHIN THE PAST 12 MONTHS, YOU WORRIED THAT YOUR FOOD WOULD RUN OUT BEFORE YOU GOT MONEY TO BUY MORE.: NEVER TRUE

## 2024-01-27 SDOH — ECONOMIC STABILITY: TRANSPORTATION INSECURITY
IN THE PAST 12 MONTHS, HAS LACK OF TRANSPORTATION KEPT YOU FROM MEETINGS, WORK, OR FROM GETTING THINGS NEEDED FOR DAILY LIVING?: NO

## 2024-01-27 SDOH — HEALTH STABILITY: MENTAL HEALTH: BEHAVIORS/MOOD: PLEASANT;CALM

## 2024-01-27 SDOH — SOCIAL STABILITY: SOCIAL INSECURITY: DO YOU FEEL UNSAFE GOING BACK TO THE PLACE WHERE YOU ARE LIVING?: NO

## 2024-01-27 SDOH — ECONOMIC STABILITY: HOUSING INSECURITY: IN THE LAST 12 MONTHS, HOW MANY PLACES HAVE YOU LIVED?: 1

## 2024-01-27 SDOH — HEALTH STABILITY: PHYSICAL HEALTH: ON AVERAGE, HOW MANY MINUTES DO YOU ENGAGE IN EXERCISE AT THIS LEVEL?: 0 MIN

## 2024-01-27 SDOH — SOCIAL STABILITY: SOCIAL INSECURITY: ARE THERE ANY APPARENT SIGNS OF INJURIES/BEHAVIORS THAT COULD BE RELATED TO ABUSE/NEGLECT?: NO

## 2024-01-27 SDOH — SOCIAL STABILITY: SOCIAL INSECURITY: DO YOU FEEL ANYONE HAS EXPLOITED OR TAKEN ADVANTAGE OF YOU FINANCIALLY OR OF YOUR PERSONAL PROPERTY?: NO

## 2024-01-27 SDOH — HEALTH STABILITY: MENTAL HEALTH: BEHAVIORS/MOOD: SLEEPING;CALM

## 2024-01-27 ASSESSMENT — LIFESTYLE VARIABLES
CIWA TOTAL SCORE: 0
BLOOD PRESSURE: 121/89
SKIP TO QUESTIONS 9-10: 0
PAROXYSMAL SWEATS: NO SWEAT VISIBLE
AUDITORY DISTURBANCES: NOT PRESENT
HOW OFTEN DO YOU HAVE 6 OR MORE DRINKS ON ONE OCCASION: LESS THAN MONTHLY
ANXIETY: NO ANXIETY, AT EASE
VISUAL DISTURBANCES: NOT PRESENT
PULSE: 76
AUDIT-C TOTAL SCORE: 3
ORIENTATION AND CLOUDING OF SENSORIUM: ORIENTED AND CAN DO SERIAL ADDITIONS
HOW OFTEN DO YOU HAVE A DRINK CONTAINING ALCOHOL: MONTHLY OR LESS
NAUSEA AND VOMITING: NO NAUSEA AND NO VOMITING
HOW MANY STANDARD DRINKS CONTAINING ALCOHOL DO YOU HAVE ON A TYPICAL DAY: 3 OR 4
AGITATION: NORMAL ACTIVITY
AUDIT-C TOTAL SCORE: 3
TREMOR: NO TREMOR
TOTAL_SCORE: 0
HEADACHE, FULLNESS IN HEAD: NOT PRESENT

## 2024-01-27 ASSESSMENT — ACTIVITIES OF DAILY LIVING (ADL)
PATIENT'S MEMORY ADEQUATE TO SAFELY COMPLETE DAILY ACTIVITIES?: YES
HEARING - LEFT EAR: FUNCTIONAL
JUDGMENT_ADEQUATE_SAFELY_COMPLETE_DAILY_ACTIVITIES: YES
HEARING - RIGHT EAR: FUNCTIONAL
TOILETING: INDEPENDENT
WALKS IN HOME: INDEPENDENT
BATHING: INDEPENDENT
ADEQUATE_TO_COMPLETE_ADL: YES
DRESSING YOURSELF: INDEPENDENT
GROOMING: INDEPENDENT
FEEDING YOURSELF: INDEPENDENT

## 2024-01-27 ASSESSMENT — PAIN - FUNCTIONAL ASSESSMENT: PAIN_FUNCTIONAL_ASSESSMENT: 0-10

## 2024-01-27 ASSESSMENT — PATIENT HEALTH QUESTIONNAIRE - PHQ9
2. FEELING DOWN, DEPRESSED OR HOPELESS: SEVERAL DAYS
1. LITTLE INTEREST OR PLEASURE IN DOING THINGS: SEVERAL DAYS
SUM OF ALL RESPONSES TO PHQ9 QUESTIONS 1 & 2: 2

## 2024-01-27 ASSESSMENT — PAIN SCALES - GENERAL: PAINLEVEL_OUTOF10: 0 - NO PAIN

## 2024-01-27 NOTE — PROGRESS NOTES
Emergency Medicine Transition of Care Note.    I received Teo Acevedo in signout from Dr. Dinero.  Please see the previous ED provider note for all HPI, PE and MDM up to the time of signout. This is in addition to the primary record.    In brief Teo Acevedo is an 38 y.o. male presenting for   Chief Complaint   Patient presents with    Psychiatric Evaluation     At the time of signout we were awaiting: psychiatric placement    Diagnoses as of 01/27/24 0733   Acute psychosis (CMS/[facility])       Medical Decision Making  Patient's home medicines patient's have already been ordered.  She was monitored in the emergency department without further concerns.  Signed out to the morning physician pending psychiatric placement.    Final diagnoses:   [F23] Acute psychosis (CMS/HCC)           Procedure  Procedures    Anahi Bradley DO

## 2024-01-27 NOTE — SIGNIFICANT EVENT
Application for Emergency Admission      Ready for Transfer?  Is the patient medically cleared for transfer to inpatient psychiatry: Yes  Has the patient been accepted to an inpatient psychiatric hospital: Yes    Application for Emergency Admission  IN ACCORDANCE WITH SECTION 5122.10 [place]  The Chief Clinical Officer of: [facility] 1/27/2024 .1:23 PM Dr. Velasquez    Reason for Hospitalization  The undersigned has reason to believe that: Teo Acevedo Is a mentally ill person subject to hospitalization by court order under division B Section 5122.01 of the Revised Code, i.e., this person:    1.No  Represents a substantial risk of physical harm to self as manifested by evidence of threats of, or attempts at, suicide or serious self-inflicted bodily harm    2.Yes Represents a substantial risk of physical harm to others as manifested by evidence of recent homicidal or other violent behavior, evidence of recent threats that place another in reasonable fear of violent behavior and serious physical harm, or other evidence of present dangerousness    3.No Represents a substantial and immediate risk of serious physical impairment or injury to self as manifested by  evidence that the person is unable to provide for and is not providing for the person's basic physical needs because of the person's mental illness and that appropriate provision for those needs cannot be made  immediately available in the community    4.Yes Would benefit from treatment in a hospital for his mental illness and is in need of such treatment as manifested by evidence of behavior that creates a grave and imminent risk to substantial rights of others or  himself.    5.Yes Would benefit from treatment as manifested by evidence of behavior that indicates all of the following:       (a) The person is unlikely to survive safely in the community without supervision, based on a clinical determination.       (b) The person has a history of lack of  compliance with treatment for mental illness and one of the following applies:      (i) At least twice within the thirty-six months prior to the filing of an affidavit seeking court-ordered treatment of the person under section 5122.111 of the Revised Code, the lack of compliance has been a significant factor in necessitating hospitalization in a hospital or receipt of services in a forensic or other mental health unit of a correctional facility, provided that the thirty-six-month period shall be extended by the length of any hospitalization or incarceration of the person that occurred within the thirty-six-month period.      (ii) Within the forty-eight months prior to the filing of an affidavit seeking court-ordered treatment of the person under section 5122.111 of the Revised Code, the lack of compliance resulted in one or more acts of serious violent behavior toward self or others or threats of, or attempts at, serious physical harm to self or others, provided that the forty-eight-month period shall be extended by the length of any hospitalization or incarceration of the person that occurred within the forty-eight-month period.      (c) The person, as a result of mental illness, is unlikely to voluntarily participate in necessary treatment.       (d) In view of the person's treatment history and current behavior, the person is in need of treatment in order to prevent a relapse or deterioration that would be likely to result in substantial risk of serious harm to the person or others.    (e) Represents a substantial risk of physical harm to self or others if allowed to remain at liberty pending examination.    Therefore, it is requested that said person be admitted to the above named facility.    STATEMENT OF BELIEF    Must be filled out by one of the following: a psychiatrist, licensed physician, licensed clinical psychologist, health or ,  or .  (Statement shall include the  circumstances under which the individual was taken into custody and the reason for the person's belief that hospitalization is necessary. The statement shall also include a reference to efforts made to secure the individual's property at his residence if he was taken into custody there. Every reasonable and appropriate effort should be made to take this person into custody in the least conspicuous manner possible.)     Patient presents for a psychiatric evaluation.  He states that he believes he needs a psychiatric evaluation.  Significant other is here and is concerned because he believes that his family is out to get him.  He is very paranoid about this.  The police have been to the house multiple times because he calls them.  He has not noted any specific threats.  He had an intake evaluation at [facility] but no other psychiatric evaluations.  He is on anxiety medications and Adderall at home.  He denies being suicidal.  He denies any auditory or visual hallucinations just the paranoia.  He admits to drinking alcohol and smoking marijuana but denies any methamphetamine or any other hard drug use     Marty R LeJeune, DO 1/27/2024     _____________________________________________________________   Place of Employment: [facility]    STATEMENT OF OBSERVATION BY PSYCHIATRIST, LICENSED PHYSICIAN, OR LICENSED CLINICAL PSYCHOLOGIST, IF APPLICABLE    Place of Observation (e.g., [facility] mental health center, general hospital, office, emergency facility)  (If applicable, please complete)    Marty R LeJeune, DO 1/27/2024    _____________________________________________________________

## 2024-01-27 NOTE — ED PROVIDER NOTES
[facility]  Emergency department provider transition of care note       Assumed care of patient that was signed out to me in stable condition with work-up /disposition pending.    History/Exam limitations: none.   Additional history was obtained from past medical records.    HPI: Teo Acevedo  is a 38 y.o. with a complaint of   Chief Complaint   Patient presents with    Psychiatric Evaluation     Past medical history and surgical history reviewed and as documented.  History reviewed and as noted. past medical records reviewed.    Past medical records, triage/nursing notes and vital signs reviewed as available and as noted above.    PHYSICAL EXAM  Vital Signs reviewed and noted to be within normal limits. the patient is not hypoxic.  -General: Alert, no acute distress, patient resting comfortably  -Skin: warm, intact, no pallor noted  -Head: Normocephalic, atraumatic  -Eye: Normal conjunctiva  -Cardiac: Normal peripheral perfusion  -Respiratory: No acute distress  -Musculoskeletal: No deformity, full ROM.  -Neurological: alert and oriented, normal sensory and motor observed.  -Psychiatric: Cooperative        Labs and imaging reviewed by me  and note     Labs Reviewed   COMPREHENSIVE METABOLIC PANEL - Abnormal       Result Value    Glucose 122 (*)     Sodium 134 (*)     Potassium 4.0      Chloride 99      Bicarbonate 27      Anion Gap 12      Urea Nitrogen 20      Creatinine 0.81      eGFR >90      Calcium 8.8      Albumin 4.2      Alkaline Phosphatase 61      Total Protein 6.8      AST 15      Bilirubin, Total 0.5      ALT 9 (*)    DRUG SCREEN,URINE - Abnormal    Amphetamine Screen, Urine Presumptive Positive (*)     Barbiturate Screen, Urine Presumptive Negative      Benzodiazepines Screen, Urine Presumptive Negative      Cannabinoid Screen, Urine Presumptive Positive (*)     Cocaine Metabolite Screen, Urine Presumptive Negative      Fentanyl Screen, Urine Presumptive Negative      Opiate Screen, Urine  Presumptive Negative      Oxycodone Screen, Urine Presumptive Negative      PCP Screen, Urine Presumptive Negative      Narrative:     Drug screen results are presumptive and should not be used to assess   compliance with prescribed medication. Contact the performing [facility] laboratory   to add-on definitive confirmatory testing if clinically indicated.    Toxicology screening results are reported qualitatively. The concentration must   be greater than or equal to the cutoff to be reported as positive. The concentration   at which the screening test can detect an individual drug or metabolite varies.   The absence of expected drug(s) and/or drug metabolite(s) may indicate non-compliance,   inappropriate timing of specimen collection relative to drug administration, poor drug   absorption, diluted/adulterated urine, or limitations of testing. For medical purposes   only; not valid for forensic use.    Interpretive questions should be directed to the laboratory medical directors.   URINALYSIS WITH REFLEX MICROSCOPIC - Abnormal    Color, Urine Yellow      Appearance, Urine Hazy (*)     Specific Gravity, Urine 1.014      pH, Urine 8.0      Protein, Urine NEGATIVE      Glucose, Urine NEGATIVE      Blood, Urine NEGATIVE      Ketones, Urine NEGATIVE      Bilirubin, Urine NEGATIVE      Urobilinogen, Urine <2.0      Nitrite, Urine NEGATIVE      Leukocyte Esterase, Urine TRACE (*)    MICROSCOPIC ONLY, URINE - Abnormal    WBC, Urine 21-50 (*)     RBC, Urine NONE      Squamous Epithelial Cells, Urine 1-9 (SPARSE)      Bacteria, Urine 1+ (*)     Mucus, Urine 1+      Amorphous Crystals, Urine 2+     ACUTE TOXICOLOGY PANEL, BLOOD - Normal    Acetaminophen <10.0      Salicylate  <3      Alcohol <10     TSH WITH REFLEX TO FREE T4 IF ABNORMAL - Normal    Thyroid Stimulating Hormone 0.62      Narrative:     TSH testing is performed using different testing methodology at [facility] than at other [facility]. Direct  result comparisons should only be made within the same method.     SARS-COV-2 PCR, SCREEN ASYMPTOMATIC - Normal    Coronavirus 2019, PCR Not Detected      Narrative:     This assay has received FDA Emergency Use Authorization (EUA) and is only authorized for the duration of time that circumstances exist to justify the authorization of the emergency use of in vitro diagnostic tests for the detection of SARS-CoV-2 virus and/or diagnosis of COVID-19 infection under section 564(b)(1) of the Act, 21 U.S.C. 360bbb-3(b)(1). This assay is an in vitro diagnostic nucleic acid amplification test for the qualitative detection of SARS-CoV-2 from nasopharyngeal specimens and has been validated for use at [facility]. Negative results do not preclude COVID-19 infections and should not be used as the sole basis for diagnosis, treatment, or other management decisions.     INFLUENZA A AND B PCR - Normal    Flu A Result Not Detected      Flu B Result Not Detected      Narrative:     This assay is an in vitro diagnostic multiplex nucleic acid amplification test for the detection and discrimination of Influenza A & B from nasopharyngeal specimens, and has been validated for use at [facility]. Negative results do not preclude Influenza A/B infections, and should not be used as the sole basis for diagnosis, treatment, or other management decisions. If Influenza A/B and RSV PCR results are negative, testing for Parainfluenza virus, Adenovirus and Metapneumovirus is routinely performed for [facility] pediatric oncology and intensive care inpatients, and is available on other patients by placing an add-on request.   CBC WITH AUTO DIFFERENTIAL    WBC 7.4      nRBC 0.0      RBC 4.51      Hemoglobin 14.0      Hematocrit 42.6      MCV 95      MCH 31.0      MCHC 32.9      RDW 13.5      Platelets 319      Neutrophils % 66.0      Immature Granulocytes %, Automated 0.3      Lymphocytes % 22.3      Monocytes %  9.5      Eosinophils % 1.4      Basophils % 0.5      Neutrophils Absolute 4.85      Immature Granulocytes Absolute, Automated 0.02      Lymphocytes Absolute 1.64      Monocytes Absolute 0.70      Eosinophils Absolute 0.10      Basophils Absolute 0.04        No orders to display            Procedure  Procedures    Medications   nicotine (Nicoderm CQ) 14 mg/24 hr patch 1 patch (0 patches transdermal Medication Removed 1/27/24 0917)   levothyroxine (Synthroid, Levoxyl) tablet 50 mcg (50 mcg oral Given 1/27/24 0656)   FLUoxetine (PROzac) capsule 20 mg (20 mg oral Given 1/27/24 0918)   lisinopril tablet 10 mg (10 mg oral Given 1/27/24 0918)   traZODone (Desyrel) tablet 25 mg (25 mg oral Not Given 1/27/24 0110)   traZODone (Desyrel) tablet 50 mg (has no administration in time range)        Diagnoses as of 01/27/24 1325   Acute psychosis (CMS/HCC)        1. Acute psychosis (CMS/HCC)             DISPOSITION: Behavioral Health    Marty LeJeune, DO  Internal & Emergency Medicine  1:25 PM   01/27/24        Marty R Lejeune, DO  Resident  01/27/24 1326

## 2024-01-28 LAB
ATRIAL RATE: 84 BPM
CHOLEST SERPL-MCNC: 206 MG/DL (ref 0–199)
CHOLESTEROL/HDL RATIO: 2.9
GLUCOSE P FAST SERPL-MCNC: 90 MG/DL (ref 74–99)
HDLC SERPL-MCNC: 70 MG/DL
LDLC SERPL CALC-MCNC: 111 MG/DL
NON HDL CHOLESTEROL: 136 MG/DL (ref 0–149)
P AXIS: 61 DEGREES
PR INTERVAL: 123 MS
Q ONSET: 252 MS
QRS COUNT: 14 BEATS
QRS DURATION: 87 MS
QT INTERVAL: 367 MS
QTC CALCULATION(BAZETT): 434 MS
QTC FREDERICIA: 410 MS
R AXIS: 80 DEGREES
T AXIS: 73 DEGREES
T OFFSET: 436 MS
TRIGL SERPL-MCNC: 127 MG/DL (ref 0–149)
VENTRICULAR RATE: 84 BPM
VLDL: 25 MG/DL (ref 0–40)

## 2024-01-28 PROCEDURE — 36415 COLL VENOUS BLD VENIPUNCTURE: CPT | Performed by: PSYCHIATRY & NEUROLOGY

## 2024-01-28 PROCEDURE — 80061 LIPID PANEL: CPT | Performed by: PSYCHIATRY & NEUROLOGY

## 2024-01-28 PROCEDURE — 2500000001 HC RX 250 WO HCPCS SELF ADMINISTERED DRUGS (ALT 637 FOR MEDICARE OP): Performed by: NURSE PRACTITIONER

## 2024-01-28 PROCEDURE — S4991 NICOTINE PATCH NONLEGEND: HCPCS | Performed by: NURSE PRACTITIONER

## 2024-01-28 PROCEDURE — 2500000002 HC RX 250 W HCPCS SELF ADMINISTERED DRUGS (ALT 637 FOR MEDICARE OP, ALT 636 FOR OP/ED): Performed by: NURSE PRACTITIONER

## 2024-01-28 PROCEDURE — 82947 ASSAY GLUCOSE BLOOD QUANT: CPT | Performed by: PSYCHIATRY & NEUROLOGY

## 2024-01-28 PROCEDURE — 1240000001 HC SEMI-PRIVATE BH ROOM DAILY

## 2024-01-28 PROCEDURE — 99223 1ST HOSP IP/OBS HIGH 75: CPT | Performed by: PSYCHIATRY & NEUROLOGY

## 2024-01-28 PROCEDURE — 2500000001 HC RX 250 WO HCPCS SELF ADMINISTERED DRUGS (ALT 637 FOR MEDICARE OP): Performed by: PSYCHIATRY & NEUROLOGY

## 2024-01-28 RX ORDER — IBUPROFEN 200 MG
1 TABLET ORAL DAILY
Status: DISCONTINUED | OUTPATIENT
Start: 2024-01-28 | End: 2024-01-28

## 2024-01-28 RX ORDER — IBUPROFEN 200 MG
1 TABLET ORAL DAILY
Status: DISCONTINUED | OUTPATIENT
Start: 2024-03-10 | End: 2024-01-28

## 2024-01-28 RX ORDER — FLUOXETINE HYDROCHLORIDE 20 MG/1
40 CAPSULE ORAL DAILY
Status: DISCONTINUED | OUTPATIENT
Start: 2024-01-29 | End: 2024-01-31 | Stop reason: HOSPADM

## 2024-01-28 RX ORDER — NICOTINE 7MG/24HR
1 PATCH, TRANSDERMAL 24 HOURS TRANSDERMAL DAILY
Status: DISCONTINUED | OUTPATIENT
Start: 2024-03-24 | End: 2024-01-28

## 2024-01-28 RX ORDER — MICONAZOLE NITRATE 2 %
2 CREAM (GRAM) TOPICAL EVERY 2 HOUR PRN
Status: DISCONTINUED | OUTPATIENT
Start: 2024-01-28 | End: 2024-01-28

## 2024-01-28 RX ORDER — IBUPROFEN 200 MG
1 TABLET ORAL DAILY
Status: DISCONTINUED | OUTPATIENT
Start: 2024-01-28 | End: 2024-01-31 | Stop reason: HOSPADM

## 2024-01-28 RX ORDER — DEXTROAMPHETAMINE SACCHARATE, AMPHETAMINE ASPARTATE, DEXTROAMPHETAMINE SULFATE AND AMPHETAMINE SULFATE 2.5; 2.5; 2.5; 2.5 MG/1; MG/1; MG/1; MG/1
30 TABLET ORAL DAILY
Status: DISCONTINUED | OUTPATIENT
Start: 2024-01-29 | End: 2024-01-31 | Stop reason: HOSPADM

## 2024-01-28 RX ORDER — MICONAZOLE NITRATE 2 %
4 CREAM (GRAM) TOPICAL EVERY 2 HOUR PRN
Status: DISCONTINUED | OUTPATIENT
Start: 2024-01-28 | End: 2024-01-31 | Stop reason: HOSPADM

## 2024-01-28 RX ADMIN — DEXTROAMPHETAMINE SACCHARATE, AMPHETAMINE ASPARTATE, DEXTROAMPHETAMINE SULFATE AND AMPHETAMINE SULFATE 30 MG: 2.5; 2.5; 2.5; 2.5 TABLET ORAL at 13:42

## 2024-01-28 RX ADMIN — FLUOXETINE 20 MG: 20 CAPSULE ORAL at 08:16

## 2024-01-28 RX ADMIN — LEVOTHYROXINE SODIUM 50 MCG: 50 TABLET ORAL at 06:25

## 2024-01-28 RX ADMIN — DEXTROAMPHETAMINE SACCHARATE, AMPHETAMINE ASPARTATE, DEXTROAMPHETAMINE SULFATE AND AMPHETAMINE SULFATE 30 MG: 2.5; 2.5; 2.5; 2.5 TABLET ORAL at 08:16

## 2024-01-28 RX ADMIN — TRAZODONE HYDROCHLORIDE 150 MG: 100 TABLET ORAL at 23:33

## 2024-01-28 RX ADMIN — NICOTINE POLACRILEX 4 MG: 2 GUM, CHEWING BUCCAL at 09:58

## 2024-01-28 RX ADMIN — NICOTINE POLACRILEX 4 MG: 2 GUM, CHEWING BUCCAL at 21:03

## 2024-01-28 RX ADMIN — NICOTINE POLACRILEX 4 MG: 2 GUM, CHEWING BUCCAL at 16:27

## 2024-01-28 RX ADMIN — LISINOPRIL 10 MG: 5 TABLET ORAL at 08:16

## 2024-01-28 RX ADMIN — NICOTINE 1 PATCH: 21 PATCH, EXTENDED RELEASE TRANSDERMAL at 09:58

## 2024-01-28 SDOH — SOCIAL STABILITY: SOCIAL INSECURITY: HAVE YOU HAD THOUGHTS OF HARMING ANYONE ELSE?: YES

## 2024-01-28 SDOH — SOCIAL STABILITY: SOCIAL INSECURITY: ARE THERE ANY APPARENT SIGNS OF INJURIES/BEHAVIORS THAT COULD BE RELATED TO ABUSE/NEGLECT?: NO

## 2024-01-28 SDOH — SOCIAL STABILITY: SOCIAL INSECURITY: DOES ANYONE TRY TO KEEP YOU FROM HAVING/CONTACTING OTHER FRIENDS OR DOING THINGS OUTSIDE YOUR HOME?: NO

## 2024-01-28 SDOH — HEALTH STABILITY: MENTAL HEALTH
EXPERIENCED ANY OF THE FOLLOWING LIFE EVENTS: CHILDHOOD NEGLECT;PHYSICAL ASSAULT;LIFE-THREATENING ILLNESS OR INJURY;SOCIAL LOSS (BANKRUPTCY, DIVORCE, WORK-RELATED STRESS)

## 2024-01-28 SDOH — SOCIAL STABILITY: SOCIAL INSECURITY: ABUSE: ADULT

## 2024-01-28 SDOH — SOCIAL STABILITY: SOCIAL INSECURITY: ARE YOU OR HAVE YOU BEEN THREATENED OR ABUSED PHYSICALLY, EMOTIONALLY, OR SEXUALLY BY ANYONE?: NO

## 2024-01-28 SDOH — SOCIAL STABILITY: SOCIAL INSECURITY: DO YOU FEEL ANYONE HAS EXPLOITED OR TAKEN ADVANTAGE OF YOU FINANCIALLY OR OF YOUR PERSONAL PROPERTY?: NO

## 2024-01-28 SDOH — SOCIAL STABILITY: SOCIAL INSECURITY: HAS ANYONE EVER THREATENED TO HURT YOUR FAMILY OR YOUR PETS?: NO

## 2024-01-28 SDOH — SOCIAL STABILITY: SOCIAL INSECURITY: WERE YOU ABLE TO COMPLETE ALL THE BEHAVIORAL HEALTH SCREENINGS?: YES

## 2024-01-28 SDOH — SOCIAL STABILITY: SOCIAL INSECURITY: DO YOU FEEL UNSAFE GOING BACK TO THE PLACE WHERE YOU ARE LIVING?: NO

## 2024-01-28 ASSESSMENT — PAIN SCALES - GENERAL
PAINLEVEL_OUTOF10: 0 - NO PAIN
PAINLEVEL_OUTOF10: 0 - NO PAIN

## 2024-01-28 ASSESSMENT — LIFESTYLE VARIABLES
AUDIT-C TOTAL SCORE: 12
HOW OFTEN DO YOU HAVE 6 OR MORE DRINKS ON ONE OCCASION: DAILY OR ALMOST DAILY
PRESCIPTION_ABUSE_PAST_12_MONTHS: NO
AUDIT-C TOTAL SCORE: 12
HOW MANY STANDARD DRINKS CONTAINING ALCOHOL DO YOU HAVE ON A TYPICAL DAY: 10 OR MORE
AUDIT TOTAL SCORE: 4
HAS A RELATIVE, FRIEND, DOCTOR, OR ANOTHER HEALTH PROFESSIONAL EXPRESSED CONCERN ABOUT YOUR DRINKING OR SUGGESTED YOU CUT DOWN: YES, DURING THE LAST YEAR
HOW OFTEN DO YOU HAVE A DRINK CONTAINING ALCOHOL: 4 OR MORE TIMES A WEEK
SUBSTANCE_ABUSE_PAST_12_MONTHS: YES
SKIP TO QUESTIONS 9-10: 0

## 2024-01-28 ASSESSMENT — PAIN - FUNCTIONAL ASSESSMENT
PAIN_FUNCTIONAL_ASSESSMENT: 0-10
PAIN_FUNCTIONAL_ASSESSMENT: 0-10

## 2024-01-28 NOTE — CARE PLAN
Care Plan initiated. Patient will be encouraged to attend daily programming to promote positive social interaction, healthy coping mechanisms, and enhance leisure skills/awareness.

## 2024-01-28 NOTE — PROGRESS NOTES
Patient received in signout at 0700, please see previous ED provider note for full ED course.  Briefly this is a 38-year-old male who presented to the emergency department with acute psychosis, was medically evaluated and cleared for psychiatric evaluation and evaluated by [facility] who is recommending inpatient placement.  May potentially be able to go to [facility].  Signed out to me pending official psychiatric placement.  At time of signout, patient is sleeping comfortably, hemodynamically stable.  Remains medically clear.  Monitored in the emergency department during my shift and is signed out to the oncoming physician at 1600 pending psychiatric acceptance and transfer.    Lucia Matthews, DO

## 2024-01-28 NOTE — H&P
PAST PSYCHIATRIC HISTORY/ PAST PSYCHIATRIC MEDICATION HISTORY:      SOCIAL HISTORY:    FAMILY HISTORY:    SUBSTANCE USE HISTORY:      TRAUMA HISTORY:      -------------------------------  PAST MEDICAL HISTORY:       Current Outpatient Medications   Medication Instructions    amphetamine-dextroamphetamine (Adderall) 30 mg tablet 30 mg, oral, 2 times daily    FLUoxetine (PROZAC) 20 mg, oral, Daily RT    levothyroxine (SYNTHROID, LEVOXYL) 50 mcg, oral, Daily RT    lisinopril 10 mg, oral, Daily RT     [unfilled]  No past medical history on file.    CURRENT MEDICATIONS:    amphetamine-dextroamphetamine, 30 mg, oral, BID after breakfast and lunch  FLUoxetine, 20 mg, oral, Daily  levothyroxine, 50 mcg, oral, Daily  lisinopril, 10 mg, oral, Daily  nicotine, 1 patch, transdermal, Daily      PRN medications: diphenhydrAMINE **OR** diphenhydrAMINE, haloperidol **OR** haloperidol lactate, haloperidol **OR** haloperidol lactate, ibuprofen, LORazepam **OR** LORazepam, nicotine polacrilex, polyethylene glycol, traZODone    ALLERGIES:  No Known Allergies    ----------------------  Electrocardiogram, 12-lead    Result Date: 1/25/2024  Sinus rhythm ST elev, probable normal early repol pattern         REVIEW OF SYSTEMS:  Review of Systems       1/27/2024     6:57 AM 1/27/2024     6:35 PM 1/27/2024     6:38 PM 1/27/2024     7:00 PM 1/27/2024    10:53 PM 1/28/2024     6:37 AM 1/28/2024     6:38 AM   Vitals   Systolic 125 121 137 121 121 122 124   Diastolic 88 89 95 89 89 79 88   Heart Rate 75 76 83 76 76 79 90   Temp  36.9 °C (98.4 °F)  36.9 °C (98.4 °F)  36.2 °C (97.2 °F)    Resp 16 16  16      Height (in)    1.829 m (6')      Weight (lb)    154.98      BMI    21.02 kg/m2      BSA (m2)    1.89 m2        Daily Weight  01/27/24 : 70.3 kg (154 lb 15.7 oz)    Results for orders placed or performed during the hospital encounter of 01/27/24 (from the past 96 hour(s))   Glucose, Fasting   Result Value Ref Range    Glucose, Fasting 90  74 - 99 mg/dL   Lipid Panel   Result Value Ref Range    Cholesterol 206 (H) 0 - 199 mg/dL    HDL-Cholesterol 70.0 mg/dL    Cholesterol/HDL Ratio 2.9     LDL Calculated 111 (H) <=99 mg/dL    VLDL 25 0 - 40 mg/dL    Triglycerides 127 0 - 149 mg/dL    Non HDL Cholesterol 136 0 - 149 mg/dL         Admit to [facility] Inpatient Psychiatry Unit  Restrict to [place]  Legal Status: INVOLUNTARY  Suicide/Behavioral/Elopement Precautions  Collateral from outside resource  General PRNs: Acetaminophen prn pain, MoM prn constipation, Maalox prn dyspepsia  DVT prophylaxis: Ambulatory  Diet: Regular  Group/Milieu & Music therapy - Coping Strategies, Social Skills  EKG/Labs/imaging:  Add vitamin d levels  WILL ASSESS FOR NEED OF A MEDICAL TYPE BED FOR THE FOLLOWING CRITERIA:  fall risk r/t weakness, confusion, malnutrition, and potential medication se's (orthostaic bp, sedation dizziness). Risk of Pressure ulcers r/t malnutrition and weight loss.       Medication recommendations:       Additional Consults:  Music therapy-coping   OT consult- safety assessment, coping, collateral  Internal Medicine- medical management  Social work consult:  Coping, disposition planning  Follow up outpatient appointments  Collateral from family, friends, if allowed  Records from last outpatient mental health care source, if available     Substance Use Risk Assessment:  Nicotine: Risks of continued tobacco use was addressed with the patient which included: inpatient education and counseling of the risks of oral, esophageal as well as other organ cancers (including oral, dermatological, gastric, pancreatic, respiratory) along with the ongoing risk of neurological and cardiovascular disease/events (strokes, angina). Treatment options for cessation were offered to include: alternate tobacco products, both inpatient/outpatient counseling. Replacement products were offered during this admission and prescribed at the time of  discharge along with a referral to outpatient cessation counseling.  Alcohol: The increase in morbidity and mortality, financial, both interpersonal and physical health risk in direct relationship to the use of alcohol ( in either a binge pattern or a sustained use over time) was discussed with the patient. Risks of intoxication, disinhibition, legal and interpersonal issues as well as abuse and dependence, along with the    increased risks of organ damage (cardiac, neurological, esophageal, gastric, liver, pancreatic, renal dysfunction among others) was discussed. The risks of decreased hepatic clearance and increased medication serum drug levels along with increase in potential medication side effects, was also discussed. Options for treatment: Discussed was reduction in alcohol consumption, referral to dual diagnosis program, residential rehabilitation programs, AA, NA, REGINALDO, gabapentin and oral naltrexone, if meets criteria as a candidate for these medications.  Street Drugs: Street drug use was addressed on admission, including both physical, mental, financial and psychological risk factors of ongoing use. There are no FDA prescribed treatment medications for cannabis, stimulants use/abuse (cocaine, PCP) or hallucinogens.  Patient was screened for concomitant other drugs used (tobacco, alcohol). Treatment options available were discussed ( if applicable) AA, NA, REGINALDO, and outpatient dual diagnosis therapy, treatment programs. Patient voiced understanding of their treatment options.  Cannabis use: Patient was counseled on longer term effects on central nervous system receptors with chronic frequent use of cannabis, risk of dependence psychological, financial, drug interactions, sedative effects with mental health medications.    Goal of inpatient psychiatry includes:  -symptom relief and coordination of care to promote recovery by daily assessment of risk  - collaboration of family/friends, continuing support  plans are developed in preparation for discharge  -prevention of harm/destruction of self, others, and/or property  -prevention of of exacerbation of psychiatric symptoms  -management of medication with close monitoring of effects and control of side effects  -clinical interventions to address lack of impulse control OR SI,  HI, psychotic state, decreased functioning, failure to take medication resulting in symptom increase  - group therapy and psychoeducational groups daily  - SW consult dc planning  - The patient's admitting diagnosis, average indicated length of stay, risks and benefits of medication management the duration of need for medication treatment and post discharge plan of referral for follow up with mental health care, which will include referral to outpatient psychiatry/therapy, was reviewed with the patient, at the time of this admission.   - Safety counseling with emphasis on when and how to access 24 hour emergency services in mental as well as medical health (Mobile Crisis, 911 or coming to the nearest ER) was reviewed with the patient at the time of admission and will be reiterated during inpatient stay and at the time of discharge. Referral will be made to return to medication management, therapist, case management as is indicated.  - Discharge to community once psychiatrically stable with outpatient follow up

## 2024-01-28 NOTE — GROUP NOTE
"Group Topic: Spiritual/Devotional/Thought of the Day   Group Date: 1/28/2024  Start Time: 0930  End Time: 1020  Facilitators: LEONOR Johnston   Department: [facility] REHAB THERAPY [facility]IRTUAL    Number of Participants: 13   Group Focus: goals, personal responsibility, problem solving, self-awareness, self-esteem, and social skills  Treatment Modality: Other: Recreational Therapy   Interventions utilized were exploration, leisure development, mental fitness, problem solving, and support  Purpose: maladaptive thinking, insight or knowledge, and self-worth    Name: Teo Acevedo YOB: 1985   MR: [id number]      Facilitator: Recreational Therapist  Level of Participation: active  Quality of Participation: appropriate/pleasant, cooperative, and engaged  Interactions with others: appropriate  Mood/Affect: appropriate and positive  Triggers (if applicable): n/a  Cognition: coherent/clear  Progress: Moderate  Comments: In this therapeutic group session, \"Match up the Sayings\", patients collaborated to reconstruct fragmented famous and thought-provoking sayings. This engaging activity served as a catalyst for teamwork and cognitive stimulation. Following the successful completion of the task, the group actively engaged in meaningful discussion, delving into the meanings of the sayings and collectively exploring their practical application in everyday life.  Pt was engaged and focused on task. Pleasant and social with peers.   Plan: continue with services      "

## 2024-01-28 NOTE — CARE PLAN
Patient has been pleasant and cooperative so far this shift. He is selectively social with peers. He did endorse having nerves this morning due to never being in this situation before. Depression 5. He denies SI/HI and AH/VH. New order for Trazodone 150 mg nightly, Adderall order decreased to 30 mg daily, and increase in Prozac to 40 mg daily. Patient is aware of med changes. He has been seen by Hospitalist, Psychiatrist, and SW. He opened up about his difficult family dynamic and how he is holding on to past situations with family. He is help seeking. He is med compliant.

## 2024-01-28 NOTE — NURSING NOTE
"The pt was calm and cooperative during the interview but was preoccupied with the nicotine patch and wanting gum. The pt rated his anxiety a 7-8/10, depression 5-6/10, denies SI, HI and AVH at this time as well as pain rating it a 0/10. Last bowel movement was, \"today\" 1/27/24. Coping skills, \"I chew.\" Goal for this evening, \"Get some good sleep.\" Pt strengths, \"I'm stubborn. I'm going to use that as a strength.\" Pt had no scheduled medications but did ask for something for sleep. This nurse administered PRN Trazodone. 5 minute monitoring continued.   "

## 2024-01-28 NOTE — GROUP NOTE
Group Topic: Art Creative   Group Date: 1/28/2024  Start Time: 1400  End Time: 1545  Facilitators: LEONOR Johnston   Department: [facility] REH[facility] THER[facility] VIRTUAL    Number of Participants: 13   Group Focus: art therapy and leisure skills  Treatment Modality: Other: Recreational Therapy   Interventions utilized were exploration and leisure development  Purpose: other: creative expression, social engagement, leisure awareness, enjoyment     Name: Toe Acevedo YOB: 1985   MR: [id number]      Facilitator: Recreational Therapist  Level of Participation: active  Quality of Participation: appropriate/pleasant, cooperative, and engaged  Interactions with others: appropriate  Mood/Affect: appropriate and positive  Triggers (if applicable): n/a  Cognition: coherent/clear  Progress: Moderate  Comments: Patients were gathered to participate in painting/ceramics. This activity works on creative expression, fine motor skills, following directions, positive social interaction, and leisure awareness. Pt engaged and focused on task appropriately.   Plan: continue with services

## 2024-01-28 NOTE — CONSULTS
Consults    Reason For Consult  Medical management of HTN, hypothyroidism etc    History Of Present Illness  Teo Acevedo is a 38 y.o. male with HTN, hypothyroidism, uses marijuana, has alcohol use disorder and is a smoker who presented to [facility] from [place] with paranoia. Hospitalist was consulted for medical management. HE denies any diaphoresis, CP, SOB, N/V/D or hallucinations, or any urinary symptoms. No complaints in the ROS. Cholesterol 206, LDL, 11, HDL 70, trig 127, tsh WNL, +amphetamine use, + marijuana, UA with trace leuks and 21-50 WBC's, + 1 bacteria.      Past Medical History  HTN, hypothyroidism, ADHD, pre-DM.    Surgical History  [place] teeth removal and dental implant.     Social History  HX alcohol abuse. Last drink 5 days ago. He was drinking a 1/3 bottle of liquor a day. He smokes a pack a day. HE smokes marijuana daily and denies any other drug use.    Family History  DM in dad and maternal GF.     Allergies  Patient has no known allergies.    Review of Systems  Constitutional:  Negative for chills, diaphoresis, fever.  HENT:  Negative for sore throat and trouble swallowing.    Eyes:  Negative for visual disturbance. No visual changes.   Respiratory: Neg for cough, shortness of breath and wheezing.    Cardiovascular:  Negative for chest pain, palpitations leg swelling.   Gastrointestinal:  Negative for abdominal pain, blood in stool, constipation, diarrhea, nausea and vomiting.   Genitourinary:  Negative for dysuria, frequency and urgency.   Musculoskeletal:  Negative for back pain and neck pain. No focal weakness   Neurological:  Negative for dizziness, tremors, seizures, syncope, facial asymmetry, speech difficulty, weakness, light-headedness, numbness and headaches.   Psychiatric/Behavioral: The patient is not nervous/anxious.      Physical Exam  Constitutional:       Appearance: Normal appearance. He is normal weight.   HENT:      Head: Normocephalic and atraumatic.      Mouth/Throat:       Mouth: Mucous membranes are moist.      Pharynx: Oropharynx is clear. No oropharyngeal exudate or posterior oropharyngeal erythema.   Eyes:      Extraocular Movements: Extraocular movements intact.      Conjunctiva/sclera: Conjunctivae normal.   Cardiovascular:      Rate and Rhythm: Normal rate and regular rhythm.      Pulses: Normal pulses.      Heart sounds: Normal heart sounds. No murmur heard.  Pulmonary:      Effort: No respiratory distress.      Breath sounds: no wheezing or rhonchi. No tachypnea & labored breathing, No cyanosis.  Abdominal:      General: Bowel sounds are normal. There is no distension.      Palpations: Abdomen is soft. There is no mass.      Tenderness: There is no abdominal tenderness. There is no guarding or rebound.   Extremities: No swelling and palpable distal pulses.   Musculoskeletal:         Comments: No focal weakness   Neurological:      Mental Status: he is alert and oriented to person, place, and time.      Cranial Nerves: No cranial nerve deficit.      Sensory: No sensory deficit.      Motor: No focal weakness.       Gait: Gait normal.   Psychiatric:         Mood and Affect: Mood normal.         Behavior: Behavior normal.     Last Recorded Vitals  /88 (Patient Position: Standing)   Pulse 90   Temp 36.2 °C (97.2 °F)   Resp 16   Wt 70.3 kg (154 lb 15.7 oz)   SpO2 98%     Relevant Results  Scheduled medications  amphetamine-dextroamphetamine, 30 mg, oral, BID after breakfast and lunch  FLUoxetine, 20 mg, oral, Daily  levothyroxine, 50 mcg, oral, Daily  lisinopril, 10 mg, oral, Daily  nicotine, 1 patch, transdermal, Daily      Continuous medications     PRN medications  PRN medications: diphenhydrAMINE **OR** diphenhydrAMINE, haloperidol **OR** haloperidol lactate, haloperidol **OR** haloperidol lactate, ibuprofen, LORazepam **OR** LORazepam, nicotine polacrilex, polyethylene glycol, traZODone    Results for orders placed or performed during the hospital encounter of  01/27/24 (from the past 24 hour(s))   Glucose, Fasting   Result Value Ref Range    Glucose, Fasting 90 74 - 99 mg/dL   Lipid Panel   Result Value Ref Range    Cholesterol 206 (H) 0 - 199 mg/dL    HDL-Cholesterol 70.0 mg/dL    Cholesterol/HDL Ratio 2.9     LDL Calculated 111 (H) <=99 mg/dL    VLDL 25 0 - 40 mg/dL    Triglycerides 127 0 - 149 mg/dL    Non HDL Cholesterol 136 0 - 149 mg/dL     No results found.     Assessment/Plan   Paranoia/ADHD  Psych primary  Psych to f/u regarding routine labs ordered    Pyuria  Asymptomatic and no need to further txt    HTN  lisinopril    Hypothyroidism  TSH WNL  Synthroid    Borderline HPLD  Low fat diet  F/u with PCP in 3-6 mo for recheck    Pre-DM  Last A1C 8/2023 6.0  F/u with PCP for monitoring    Uses marijuana  Cessation education  SW consult for outpt resources    Alcohol use disorder   -Past point where he would be in withdrawal  Social work consult for outpt resources    Smoker   Nicotine replacements  Cessation education  Defer Wellbutrin start to psych    DVT Px  Encourage ambulation    Brandi Jean, APRN-CNP

## 2024-01-28 NOTE — CARE PLAN
"  Problem: Sensory Perceptual Alteration as Evidenced by  Goal: Participates in unit activities  Outcome: Progressing     Problem: Fall/Injury  Goal: Be free from injury by end of the shift  Outcome: Progressing   The patient's goals for the shift include \"get some good sleep\"    The clinical goals for the shift include maintain safety    Over the shift, the patient did make progress toward the following goals.    "

## 2024-01-28 NOTE — NURSING NOTE
"Pt was [facility] for [facility]ia. He was alert and engaged with admission process. His reason for admission \" I stopped drinking 7/22, I drank last weekend and my girlfriend embellished things to get me help\". He rates anxiety and depression both 5/10 with no SI/HI or AVH noted.  Pt identified \" family relationships that are strained\" as part of his stress. Pt was oriented to his room as well as the unit. Dr. Velasquez aware of admission and new orders released. Will continue to monitor for safety.  "
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
## 2024-01-28 NOTE — GROUP NOTE
Group Topic: Gross Motor/Balance Skills   Group Date: 1/28/2024  Start Time: 1110  End Time: 1210  Facilitators: LEONOR Johnston   Department: [facility] REH[facility] THERAPY VIRTUAL    Number of Participants: 14   Group Focus: leisure skills and social skills  Treatment Modality: Other: Recreational Therapy   Interventions utilized were exploration and leisure development  Purpose: other: physical movement, social engagement, leisure awareness, healthy competition, enjoyment     Name: Teo Acevedo YOB: 1985   MR: [id number]      Facilitator: Recreational Therapist  Level of Participation: active  Quality of Participation: appropriate/pleasant, cooperative, and engaged  Interactions with others: appropriate  Mood/Affect: appropriate and positive  Triggers (if applicable): n/a  Cognition: coherent/clear  Progress: Moderate  Comments: Patients engaged in the skilled session of Breanna. This recreational pursuit honed participants' hand-eye coordination and fine motor skills along with cultivating a convivial atmosphere, promoting social interaction and fostering a sense of achievement. The strategic nuances of the game also elevated both cognitive engagement and camaraderie contributing to the multifaceted well-being of those involved in this engaging therapeutic activity.   Pt was actively engaged in task. Pleasant and social with peers.   Plan: continue with services
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
## 2024-01-29 PROCEDURE — 2500000002 HC RX 250 W HCPCS SELF ADMINISTERED DRUGS (ALT 637 FOR MEDICARE OP, ALT 636 FOR OP/ED): Performed by: NURSE PRACTITIONER

## 2024-01-29 PROCEDURE — S4991 NICOTINE PATCH NONLEGEND: HCPCS | Performed by: NURSE PRACTITIONER

## 2024-01-29 PROCEDURE — 2500000001 HC RX 250 WO HCPCS SELF ADMINISTERED DRUGS (ALT 637 FOR MEDICARE OP): Performed by: PSYCHIATRY & NEUROLOGY

## 2024-01-29 PROCEDURE — 2500000001 HC RX 250 WO HCPCS SELF ADMINISTERED DRUGS (ALT 637 FOR MEDICARE OP): Performed by: NURSE PRACTITIONER

## 2024-01-29 PROCEDURE — 97165 OT EVAL LOW COMPLEX 30 MIN: CPT | Mod: GO

## 2024-01-29 PROCEDURE — 2500000004 HC RX 250 GENERAL PHARMACY W/ HCPCS (ALT 636 FOR OP/ED): Performed by: PSYCHIATRY & NEUROLOGY

## 2024-01-29 PROCEDURE — 1240000001 HC SEMI-PRIVATE BH ROOM DAILY

## 2024-01-29 PROCEDURE — 97150 GROUP THERAPEUTIC PROCEDURES: CPT | Mod: GO,CO

## 2024-01-29 PROCEDURE — 99233 SBSQ HOSP IP/OBS HIGH 50: CPT | Performed by: PSYCHIATRY & NEUROLOGY

## 2024-01-29 RX ORDER — LORAZEPAM 2 MG/ML
1 INJECTION INTRAMUSCULAR EVERY 6 HOURS PRN
Status: DISCONTINUED | OUTPATIENT
Start: 2024-01-29 | End: 2024-01-31 | Stop reason: HOSPADM

## 2024-01-29 RX ORDER — QUETIAPINE FUMARATE 50 MG/1
50 TABLET, FILM COATED ORAL NIGHTLY
Status: DISCONTINUED | OUTPATIENT
Start: 2024-01-29 | End: 2024-01-30

## 2024-01-29 RX ORDER — ACETAMINOPHEN 500 MG
5 TABLET ORAL ONCE
Status: COMPLETED | OUTPATIENT
Start: 2024-01-29 | End: 2024-01-29

## 2024-01-29 RX ORDER — LORAZEPAM 0.5 MG/1
0.5 TABLET ORAL EVERY 6 HOURS PRN
Status: DISCONTINUED | OUTPATIENT
Start: 2024-01-29 | End: 2024-01-31 | Stop reason: HOSPADM

## 2024-01-29 RX ADMIN — FLUOXETINE 40 MG: 20 CAPSULE ORAL at 08:53

## 2024-01-29 RX ADMIN — LORAZEPAM 0.5 MG: 0.5 TABLET ORAL at 20:20

## 2024-01-29 RX ADMIN — DEXTROAMPHETAMINE SACCHARATE, AMPHETAMINE ASPARTATE, DEXTROAMPHETAMINE SULFATE AND AMPHETAMINE SULFATE 30 MG: 2.5; 2.5; 2.5; 2.5 TABLET ORAL at 08:53

## 2024-01-29 RX ADMIN — LEVOTHYROXINE SODIUM 50 MCG: 50 TABLET ORAL at 06:00

## 2024-01-29 RX ADMIN — Medication 5 MG: at 23:11

## 2024-01-29 RX ADMIN — NICOTINE POLACRILEX 4 MG: 2 GUM, CHEWING BUCCAL at 23:14

## 2024-01-29 RX ADMIN — LISINOPRIL 10 MG: 5 TABLET ORAL at 08:53

## 2024-01-29 RX ADMIN — NICOTINE POLACRILEX 4 MG: 2 GUM, CHEWING BUCCAL at 13:12

## 2024-01-29 RX ADMIN — QUETIAPINE FUMARATE 50 MG: 50 TABLET ORAL at 21:04

## 2024-01-29 RX ADMIN — NICOTINE POLACRILEX 4 MG: 2 GUM, CHEWING BUCCAL at 20:20

## 2024-01-29 RX ADMIN — NICOTINE 1 PATCH: 21 PATCH, EXTENDED RELEASE TRANSDERMAL at 08:53

## 2024-01-29 RX ADMIN — NICOTINE POLACRILEX 4 MG: 2 GUM, CHEWING BUCCAL at 15:49

## 2024-01-29 RX ADMIN — NICOTINE POLACRILEX 4 MG: 2 GUM, CHEWING BUCCAL at 09:03

## 2024-01-29 ASSESSMENT — PAIN SCALES - GENERAL
PAINLEVEL_OUTOF10: 0 - NO PAIN
PAINLEVEL_OUTOF10: 0 - NO PAIN

## 2024-01-29 ASSESSMENT — PAIN - FUNCTIONAL ASSESSMENT
PAIN_FUNCTIONAL_ASSESSMENT: 0-10
PAIN_FUNCTIONAL_ASSESSMENT: 0-10

## 2024-01-29 NOTE — PROGRESS NOTES
"Teo Acevedo is a 38 y.o. male on day 2 of admission presenting with Depression.    The patient was seen and examined. I reviewed the chart and vital signs from overnight. I reviewed previous notes. I reviewed medications, administered overnight and their reported benefits or side effects. Patient reported by nursing to have slept poorly with ruminating though delayed getting to sleep. Nurse reported though 6.5 hours unbroken. Girlfriend visited which when we approached her and requested if we could speak as a family and go over his care, patient at the table, said \"sure\". Tara voiced frustration with the patient's perseveration on \"no one listens to him\", which she states is not true. Patient voiced that his parents are bad people and hindered him and did not help him. He was stating that no one believes him or takes his side that what happened what not his fault. He's stated yesterday his father paid off his student loan and is making payments on his car (under father's name). Patient is not working could not give me a reason. I asked if his family is still helping him, he said \"yes\", asking why are they paying his mortgage and credit cards, if he dislikes them. Patient did not want to talk about it and walked away.  Attended group today.    Mental Status Exam:   General: appropriately groomed and dressed in hospital attire.  Appearance: appears stated age.  Attitude: calm, cooperative.  Behavior: appropriate eye contact,  can be irritable, but not agitated or aggressive  Motor Activity: no agitation or retardation. No EPS/TD, normal gait and station, normal muscle tone and bulk.  Speech: regular rate, rhythm, volume and tone, spontaneous, fluent, non-pressured.  Mood: depression 5 and anxiety 3  Affect: irritable  Thought Process: organized, and goal directed.  Thought Content: does not currently endorse suicidal ideation,  denies homicidal ideations, no delusions elicited   Thought Perception: does not endorse " auditory hallucinations, denies visual hallucinations, no tactile, olfactory, or gustatory hallucinations elicited.   Cognition: alert, oriented to person, place and time, adequate fund of knowledge, no deficit in recent and remote memory, no deficits in attention, concentration or language.  Insight: fair, as patient recognizes symptoms of  illness and need for recommended treatments.   Judgment: fair, as patient can make reasonable decisions about ordinary activities of daily living and necessary medical care recommendations.      Objective:      Vitals:      1/28/2024     6:37 AM 1/28/2024     6:38 AM 1/28/2024     6:05 PM 1/28/2024     6:07 PM 1/28/2024     7:00 PM 1/29/2024     6:05 AM 1/29/2024     6:06 AM   Vitals   Systolic 122 124 126 115  136 149   Diastolic 79 88 85 84  93 90   Heart Rate 79 90 90 102  83 87   Temp 36.2 °C (97.2 °F)  36.2 °C (97.2 °F)   35.8 °C (96.4 °F)    Resp   16       Weight (lb)     159.17     BMI     21.59 kg/m2     BSA (m2)     1.92 m2          Medications:  Scheduled medications  amphetamine-dextroamphetamine, 30 mg, oral, Daily  FLUoxetine, 40 mg, oral, Daily  levothyroxine, 50 mcg, oral, Daily  lisinopril, 10 mg, oral, Daily  nicotine, 1 patch, transdermal, Daily  QUEtiapine, 50 mg, oral, Nightly      Continuous medications     PRN medications  PRN medications: diphenhydrAMINE **OR** diphenhydrAMINE, haloperidol **OR** haloperidol lactate, haloperidol **OR** haloperidol lactate, ibuprofen, LORazepam **OR** LORazepam, nicotine polacrilex, polyethylene glycol   Medications Discontinued During This Encounter   Medication Reason    levothyroxine (Synthroid, Levoxyl) tablet 50 mcg     nicotine (Nicoderm CQ) 21 mg/24 hr patch 1 patch     nicotine (Nicoderm CQ) 14 mg/24 hr patch 1 patch     nicotine (Nicoderm CQ) 7 mg/24 hr patch 1 patch     nicotine polacrilex (Nicorette) gum 2 mg     nicotine (Nicoderm CQ) 14 mg/24 hr patch 1 patch     FLUoxetine (PROzac) capsule 20 mg     traZODone  (Desyrel) tablet 100 mg     amphetamine-dextroamphetamine (Adderall) tablet 30 mg     traZODone (Desyrel) tablet 150 mg           Labs:  Results for orders placed or performed during the hospital encounter of 01/27/24 (from the past 96 hour(s))   Glucose, Fasting   Result Value Ref Range    Glucose, Fasting 90 74 - 99 mg/dL   Lipid Panel   Result Value Ref Range    Cholesterol 206 (H) 0 - 199 mg/dL    HDL-Cholesterol 70.0 mg/dL    Cholesterol/HDL Ratio 2.9     LDL Calculated 111 (H) <=99 mg/dL    VLDL 25 0 - 40 mg/dL    Triglycerides 127 0 - 149 mg/dL    Non HDL Cholesterol 136 0 - 149 mg/dL          Assessment Plan of Care:  Rule out bipolar disorder  Rule out axis 2 b cluster   Major Depression, severe, without psychosis  Alcohol use disorder, relapse, break in sobriety with intoxication, complication (homicidal statement)  Nicotine Use Disorder  ADD  No insurance    Agreed to change trazodone to an alternative sleep aid, will trial seroquel 50mg at bedtime for mood, lability, obsessive perseveration, anxiety and sleep.  Patient has not agreed to sign in, risk assessment completed today, Xochitl has not safety concerns for the patient to come home this week  Referral is being made to [facility] for dual diagnosis, therapy, psychiatry        I spent 6 minutes in the professional and overall care of this patient.      Jody Velasquez MD

## 2024-01-29 NOTE — CARE PLAN
"  Problem: Potential for Harm to Self or Others  Goal: Participates in unit activities  Outcome: Progressing     Problem: Defensive Coping  Goal: Identifies reckless/dangerous behavior  Outcome: Progressing     Problem: Fall/Injury  Goal: Be free from injury by end of the shift  Outcome: Progressing   The patient's goals for the shift include \"Get good sleep tonight.\"    The clinical goals for the shift include maintain safety    Over the shift, the patient did make progress toward the following goals. The pt had an uneventful evening sleeping throughout the night. The pt did not require/request any PRN medications throughout the night. The pt is currently resting in bed. 15 minute monitoring continued.     "

## 2024-01-29 NOTE — PROGRESS NOTES
"Occupational Therapy     REHAB Therapy Assessment & Treatment    Patient Name: Teo Acevedo  MRN: [id number]  Today's Date: 1/29/2024  Time: 09:08-09:20    Activity Assessment:  Initial Assessment  Attention Span: 60 Minutes or more  Cognitive Behavior Status/Orientation: Oriented to:, Person, Place, Time, Situation  Negative Coping Skills: Substance use  Additional Comments: Pt agreeable to OT evaluation, completing in pt room    Performance and Participation in Areas of Occupation:   Activities of Daily Living/Self Care:  · Self-Care Tasks: independent  · Medication Use: none reported  · Rest/Sleep: poor sleep lately due to increased anxiety    Instrumental Activities of Daily Living:  · Driving/Community Mobility: (+) driving  · Finances: increased stress due to lack of work    Education and Employment:  · Level of Education: college edu  · Current Employment: lost job due to drinking, currently unemployed  · Plan for Future Employment: pt currently not looking for employment \"until I get my mental health and using under control. There's no way I could hold a job like this\"    Leisure/Play:  · Leisure Interests: Pt reports most of his leisure interest revolve around drinking or marijuana    Social Participation/Community Involvement:  · Balanced Relationships: pt reports negative relationship with his whole family, describes them as \"toxic\". Pt reports a positive relationship with SO, states \"She felt helpless and did what she had to do to get me admitted and get help\".     Performance in Meaningful Roles:  · Life Roles/Goals; Pt reports beginning his journey to sobriety in 10/2022, with a few relapses since then including this one prior to admission. Pt is ready to change and is seeking resources for assistance.    · Meaningful Roles Comment: Describes a lack of productive daily routine    Emotional Regulation Skills:  · Emotional Expression: pt states he has already \"done more self reflection here than I ever " "have\". Admits that he does not typically express himself and his emotions in a healthy way.   · Mood Modulation: Pleasant and cooperative  · Anger: easily gets angry due to poorly expressed emotions over time.   · Anxiety Management: Pt reports decreased anxiety since this admission, due to \"everyone's friendly and helpful attitude, this is the first time I feel like people are actually trying to help me\"    Client Factors:  · Sense of Safety/Security\": lives alone with no reported safety concerns  · Realistic Expectations: Pt feels it is realistic to have a goal of moving away from his family and \"starting fresh on my own\" once he has acquired relapse and MH support.   · Coping Skills: Admits to lacking positive coping skills. Would benefit from edu and exploration of new skills, including the application into daily routine.       Encounter Problems       Encounter Problems (Active)       OT Goals       Pt will ID 3 relapse prevention strategies to employ after D/C to improve daily function  (Progressing)       Start:  01/29/24    Expected End:  02/26/24            Pt will ID 2 community resources/programs to join/attend after D/C to improve their support system  (Progressing)       Start:  01/29/24    Expected End:  02/26/24            Pt will ID 2-3 effective ways to distract from crisis and ID stressors/ personal symptoms of stress in order to improve management of stress during daily activities.   (Progressing)       Start:  01/29/24    Expected End:  02/26/24            Pt will explore and ID 1-2 strategies to manage stressors/symptoms of illness/ grief more effectively prior to discharge.   (Progressing)       Start:  01/29/24    Expected End:  02/26/24            Pt will improve ability to ID and express emotions while accepting and tolerating all emotions, in order to increase awareness of positive emotions.   (Progressing)       Start:  01/29/24    Expected End:  02/26/24                     Additional " Comments: Pt agreeable to OT POC for attendance of 5x/week group sessions and/ or 1:1 sessions to address the goals established above prior to discharge.

## 2024-01-29 NOTE — CARE PLAN
Discussed in Treatment team today;  Patient is still being stabilized;  Family meeting was conducted with patient and his girlfriend, Tara Abbott, along with Attending Dr. Velasquez; Patient was irritable, and argumentative: insisted his girlfriend does not listen to him, and she protested, insisting that she has listened to him at length, numerous times.  Patient said she was lying;  Dr. Velasquez and this sw discussed his following up with [facility] (for dual diagnosis outpatient treatment) at discharge and he agreed;  Also, patient declined to sign the BRIAN for girlfriend but did allow this family meeting to take place and did allow her, girlfriend to attend;  Patient is labile, defensive, angry, but not out of control.  At one point he asked if he could leave the meeting, and when asked why he said his girlfriend was not telling the truth.  At one point, Dr. Velasquez presented him with the possibility that he is projecting his anger at his parents, at his girlfriend, who has listened to him, when his parents have not.  Patient continued to be defensive, stating he did not understand why yesterday was such a good day and today was not, stated now no one is listening to him.  He also declined to sign the Application for Voluntary Admission;  Tentative discharge date is: this Wednesday, 01/31/2024;  sw to follow.

## 2024-01-29 NOTE — NURSING NOTE
"The pt was calm and cooperative during the interview that took place at the end of the cormier away from his peers for privacy. Pt rated his anxiety and depression both 3/10, denies SI, HI and AVH at this time as well as pain rating it a 0/10. Last bowel movement was, \"today\" 1/28/24. Coping skills, \"Messing with that puzzle.\" Goal for the evening, \"Get good sleep tonight.\" Pr strength, \"I'm kind.\" The pt was medication compliant with his evening medications. 15 minute monitoring continued.   "

## 2024-01-29 NOTE — CARE PLAN
"Patient out on the unit and social with peers this shift. Pt is pleasant and cooperative with care. Rated anxiety 3-4/10 and depression 5/10. Denied SI/HI. Denied auditory/visual/other hallucinations. No complaints of pain or discomfort. Medication compliant. Able to state positive coping skills such as \"nicotine, weed, I don't know\". The patient's goals for the shift include \"sleep\". Q15 minute checks to be maintained throughout shift for safety.       "

## 2024-01-29 NOTE — PROGRESS NOTES
Occupational Therapy     REHAB Therapy Assessment & Treatment    Patient Name: Teo Acevedo  MRN: [id number]  Today's Date: 1/29/2024      Activity Assessment:     Gratitude Awareness/ Meaningful Sharing Group: [phone]  Relaxation Video/ Affirmations Group: [id number]  Problem Solving Skills/ Team Collaboration Group: [id number]    3/3 Groups attended     Following OT eval and in collaboration with the OTR/L, pt attends and appropriately participates in all groups with G attention to task, appropriate insight and G social interaction with peers/staff. Pt with G understanding of gratitude topic as well as able to share what being grateful means in his own words. Pt then creates his own list of gratitude statements to utilize as a new coping tool for d/c sharing aloud “my dog, girlfriend, this place and the staff here, my bed, and exercise” Pt with appropriate elaboration on same. Pt takes G leadership role throughout problem solving activity as well as appropriately shares understanding how similar tasks could be used as positive distractions to stress. Pt with G progress toward OT Goals this date with no paranoid behaviors observed. Pt appropriately social and interacts well with all activities. Pt would benefit from continued OT services in order to improve overall self-esteem, personal confidence and supports with increased awareness for safe transition location at discharge.          Encounter Problems       Encounter Problems (Active)       OT Goals       Pt will ID 3 relapse prevention strategies to employ after D/C to improve daily function  (Progressing)       Start:  01/29/24    Expected End:  02/26/24            Pt will ID 2 community resources/programs to join/attend after D/C to improve their support system  (Progressing)       Start:  01/29/24    Expected End:  02/26/24            Pt will ID 2-3 effective ways to distract from crisis and ID stressors/ personal symptoms of stress in order to improve  management of stress during daily activities.   (Progressing)       Start:  01/29/24    Expected End:  02/26/24            Pt will explore and ID 1-2 strategies to manage stressors/symptoms of illness/ grief more effectively prior to discharge.   (Progressing)       Start:  01/29/24    Expected End:  02/26/24            Pt will improve ability to ID and express emotions while accepting and tolerating all emotions, in order to increase awareness of positive emotions.   (Progressing)       Start:  01/29/24    Expected End:  02/26/24                     Additional Comments:  BAL collaborated with patients nurse and charge nurse throughout the day to provide the appropriate support and encouragement to attend groups. Pt up on unit when BAL left last group of the day. All needs met.

## 2024-01-30 PROCEDURE — 2500000001 HC RX 250 WO HCPCS SELF ADMINISTERED DRUGS (ALT 637 FOR MEDICARE OP): Performed by: PSYCHIATRY & NEUROLOGY

## 2024-01-30 PROCEDURE — 2500000002 HC RX 250 W HCPCS SELF ADMINISTERED DRUGS (ALT 637 FOR MEDICARE OP, ALT 636 FOR OP/ED): Performed by: NURSE PRACTITIONER

## 2024-01-30 PROCEDURE — 2500000001 HC RX 250 WO HCPCS SELF ADMINISTERED DRUGS (ALT 637 FOR MEDICARE OP): Performed by: NURSE PRACTITIONER

## 2024-01-30 PROCEDURE — S4991 NICOTINE PATCH NONLEGEND: HCPCS | Performed by: NURSE PRACTITIONER

## 2024-01-30 PROCEDURE — 1240000001 HC SEMI-PRIVATE BH ROOM DAILY

## 2024-01-30 PROCEDURE — 99233 SBSQ HOSP IP/OBS HIGH 50: CPT | Performed by: PSYCHIATRY & NEUROLOGY

## 2024-01-30 PROCEDURE — 97150 GROUP THERAPEUTIC PROCEDURES: CPT | Mod: GO,CO

## 2024-01-30 RX ORDER — HYDROXYZINE HYDROCHLORIDE 25 MG/1
50 TABLET, FILM COATED ORAL EVERY 4 HOURS PRN
Status: DISCONTINUED | OUTPATIENT
Start: 2024-01-30 | End: 2024-01-31 | Stop reason: HOSPADM

## 2024-01-30 RX ORDER — TRAZODONE HYDROCHLORIDE 100 MG/1
100 TABLET ORAL NIGHTLY
Status: DISCONTINUED | OUTPATIENT
Start: 2024-01-30 | End: 2024-01-31 | Stop reason: HOSPADM

## 2024-01-30 RX ORDER — HYDROXYZINE HYDROCHLORIDE 25 MG/1
100 TABLET, FILM COATED ORAL NIGHTLY
Status: DISCONTINUED | OUTPATIENT
Start: 2024-01-30 | End: 2024-01-31 | Stop reason: HOSPADM

## 2024-01-30 RX ADMIN — NICOTINE POLACRILEX 4 MG: 2 GUM, CHEWING BUCCAL at 20:35

## 2024-01-30 RX ADMIN — NICOTINE 1 PATCH: 21 PATCH, EXTENDED RELEASE TRANSDERMAL at 08:40

## 2024-01-30 RX ADMIN — HYDROXYZINE HYDROCHLORIDE 100 MG: 25 TABLET ORAL at 22:50

## 2024-01-30 RX ADMIN — FLUOXETINE 40 MG: 20 CAPSULE ORAL at 08:40

## 2024-01-30 RX ADMIN — NICOTINE POLACRILEX 4 MG: 2 GUM, CHEWING BUCCAL at 06:05

## 2024-01-30 RX ADMIN — DEXTROAMPHETAMINE SACCHARATE, AMPHETAMINE ASPARTATE, DEXTROAMPHETAMINE SULFATE AND AMPHETAMINE SULFATE 30 MG: 2.5; 2.5; 2.5; 2.5 TABLET ORAL at 08:40

## 2024-01-30 RX ADMIN — NICOTINE POLACRILEX 4 MG: 2 GUM, CHEWING BUCCAL at 16:45

## 2024-01-30 RX ADMIN — LEVOTHYROXINE SODIUM 50 MCG: 50 TABLET ORAL at 06:05

## 2024-01-30 RX ADMIN — TRAZODONE HYDROCHLORIDE 100 MG: 100 TABLET ORAL at 22:50

## 2024-01-30 RX ADMIN — NICOTINE POLACRILEX 4 MG: 2 GUM, CHEWING BUCCAL at 13:24

## 2024-01-30 RX ADMIN — LISINOPRIL 10 MG: 5 TABLET ORAL at 08:40

## 2024-01-30 RX ADMIN — HYDROXYZINE HYDROCHLORIDE 50 MG: 25 TABLET ORAL at 13:24

## 2024-01-30 ASSESSMENT — PAIN SCALES - GENERAL
PAINLEVEL_OUTOF10: 0 - NO PAIN
PAINLEVEL_OUTOF10: 0 - NO PAIN

## 2024-01-30 ASSESSMENT — PAIN - FUNCTIONAL ASSESSMENT
PAIN_FUNCTIONAL_ASSESSMENT: 0-10
PAIN_FUNCTIONAL_ASSESSMENT: 0-10

## 2024-01-30 NOTE — CARE PLAN
Still stabilizing; tentative discharge tomorrow;  patient stated he will call his girlfriend and tell her to get rid of the 2 firearms in his house;  This sw phoned her and she stated he has not given permission yet; Dr. Velasquez and this sw reminded him he cannot be discharged until those firearms are secured;  he said he will call his girlfriend, Tara, this afternoon or tonight to instruct her to do so.  Phoned Tara again, she stated that patient has not called yet; sw told Tara will call her tomorrow morning to confirm that the patient's firearms are secured and out of his home, before discharge can take place.  She agreed; this sw scheduled patient's follow up appointments at [facility] and noted discharge plan accordingly. Sw to follow.

## 2024-01-30 NOTE — CARE PLAN
"The patient's goals for the shift include \"get sleep\"    The clinical goals for the shift include medication compliance    Over the shift, the patient did not make progress toward the following goals. Barriers to progression include lack of medications knowledge . Recommendations to address these barriers include more education on medications.    "

## 2024-01-30 NOTE — NURSING NOTE
"Pt interview in the front [facility] hallway Pt is calm and cooperative  Pt stated his goal \"get sleep\"  his strength \"I have strong will power\"  and his coping skill I smoke pot\"  Pt rated his anxiety 7/10  depression 7/10 (this is after his girlfriend visited) and denied everything else at this time  pt is appropriate with his answers to the questions att this time   "

## 2024-01-30 NOTE — NURSING NOTE
Pt took his night time medications and watched the end of the basketball game  Pt then went to bed and slept through the night  Pt had an uneventful night

## 2024-01-30 NOTE — DISCHARGE INSTR - APPOINTMENTS
[facility] follow up care.  At  [facility]: Note: also recommend alcohol/ dual diagnosis assessment/treatment.     Initial CPST Assessment (for Case Management Services)  With Mellissa Farrell  On Date: 02/05/2024 at  11:00 am at  [facility],  [place] Office,  [address]  [facility] [address].  P. [phone];  F. [phone];    2. Counseling, Telehealth Appointment,  With Rafael Tapia. (Also through [facility])  On Date: 02/14/2024 at 2pm.    3.Psychiatry Intake with LAMONT Bernstein  On Date: 02/16/2024 at 11 am.  At [facility]  [address], (next [address] to [address]  [place], [place]  P. [phone];  F. [phone];

## 2024-01-30 NOTE — GROUP NOTE
"Group Topic: Cognitive Focus   Group Date: 1/30/2024  Start Time: 1410  End Time: 1445  Facilitators: LEONOR Johnston   Department: [facility] REHAB THERAPY VIRTUAL    Number of Participants: 9   Group Focus: communication, leisure skills, and problem solving  Treatment Modality: Other: Recreational Therapy   Interventions utilized were exploration and leisure development  Purpose: other: creative expression, social engagement, leisure awareness, enjoyment     Name: Teo Acevedo YOB: 1985   MR: [id number]      Facilitator: Recreational Therapist  Level of Participation: active  Quality of Participation: appropriate/pleasant, cooperative, and engaged  Interactions with others: appropriate  Mood/Affect: appropriate and positive  Triggers (if applicable): n/a  Cognition: coherent/clear  Progress: Moderate  Comments: Patients were gathered to participate in the game \"I Should Have Known That\". This activity works on cognitive skills, following directions, positive social interaction/teamwork, and promotes leisure awareness. Pt was engaged in task and appropriate with peers. Pleasant and social.   Plan: continue with services      "

## 2024-01-30 NOTE — GROUP NOTE
"Group Topic: Art Creative   Group Date: 1/30/2024  Start Time: 1530  End Time: 1600  Facilitators: LEONOR Johnston   Department: [facility] REHAB THERAPY VIRTUAL    Number of Participants: 8   Group Focus: art therapy, goals, and leisure skills  Treatment Modality: Other: Recreational Therapy   Interventions utilized were exploration, leisure development, and support  Purpose: other: creative expression, leisure awareness, social engagement, gratitude, enjoyment     Name: Teo Acevedo YOB: 1985   MR: [id number]      Facilitator: Recreational Therapist  Level of Participation: active  Quality of Participation: appropriate/pleasant, cooperative, and engaged  Interactions with others: appropriate  Mood/Affect: appropriate and positive  Triggers (if applicable): n/a  Cognition: coherent/clear  Progress: Moderate  Comments: Patients were gathered to participate in \"Create a Character\". This activity works on creative expression, fine motor skills, following directions, positive social interaction, and leisure awareness. Pt was engaged in task appropriately. Pleasant and social. When asked to name something he was grateful for today, pt stated \"groups, I don't think there's been a group that I haven't enjoyed yet.\"   Plan: continue with services      "

## 2024-01-30 NOTE — GROUP NOTE
Group Topic: Music Therapy   Group Date: 1/30/2024  Start Time: 1100  End Time: 1200  Facilitators: Fuad Boudreaux   Department: [facility] EXPRESSIVE THER VIRTUAL    Number of Participants: 10   Group Focus: coping skills/planning, expressive outlet, and opportunity for choice/control  Treatment Modality: Music Therapy  Interventions Utilized were: active music engagement, empathic listening/validating emotions, passive music engagement, sharing/discussion, and songwriting/composition      Name: Teo Acevedo YOB: 1985   MR: [id number]      Level of Participation: when cued  Quality of Participation: attentive, cooperative, engaged, offered feedback, passive, and quiet  Interactions with others: appropriate and gave feedback  Mood/Affect: anxious, closed / guarded, and positive  Cognition, Pre Treatment: attentive  Cognition, Post Treatment: attentive, capable, goal directed, and minimal insight.  Progress: Gaining insight or knowledge  Plan: continue with services

## 2024-01-30 NOTE — PROGRESS NOTES
"Teo Acevedo is a 38 y.o. male on day 3 of admission presenting with Depression.    The patient was seen and examined. I reviewed the chart and vital signs from overnight. I reviewed previous notes. I reviewed medications, administered overnight and their reported benefits or side effects. Patient reported by nursing to have slept well, but does not feel comfortable using seroquel as he heard \"back things about it\". Agreed to increases atarax and retrial trazodone. Gave social work dept consent to have his girlfriend removed the guns from the home. Patient understands discharge is on hold pending requested gun removal, patient agreed to this.    Mental Status Exam:   General: appropriately groomed and dressed in hospital attire.  Appearance: appears stated age.  Attitude: calm, cooperative.  Behavior: appropriate eye contact,  can be irritable, but not agitated or aggressive  Motor Activity: no agitation or retardation. No EPS/TD, normal gait and station, normal muscle tone and bulk.  Speech: regular rate, rhythm, volume and tone, spontaneous, fluent, non-pressured.  Mood: depression 8 and anxiety 8 states he is worried about sleep, about the issues at home  Affect: stable, mild anxious  Thought Process: organized, and goal directed.  Thought Content: does not currently endorse suicidal ideation,  denies homicidal ideations, no delusions elicited   Thought Perception: does not endorse auditory hallucinations, denies visual hallucinations, no tactile, olfactory, or gustatory hallucinations elicited.   Cognition: alert, oriented to person, place and time, adequate fund of knowledge, no deficit in recent and remote memory, no deficits in attention, concentration or language.  Insight: fair, as patient recognizes symptoms of  illness and need for recommended treatments.   Judgment: fair, as patient can make reasonable decisions about ordinary activities of daily living and necessary medical care " recommendations.      Objective:      Vitals:      1/28/2024     7:00 PM 1/29/2024     6:05 AM 1/29/2024     6:06 AM 1/29/2024     6:19 PM 1/29/2024     6:20 PM 1/30/2024     5:52 AM 1/30/2024     5:58 AM   Vitals   Systolic  136 149 134 117 136 149   Diastolic  93 90 85 80 94 115   Heart Rate  83 87 92 98 78 77   Temp  35.8 °C (96.4 °F)  36.6 °C (97.9 °F)  36.2 °C (97.2 °F)    Resp    18  18    Weight (lb) 159.17         BMI 21.59 kg/m2         BSA (m2) 1.92 m2              Medications:  Scheduled medications  amphetamine-dextroamphetamine, 30 mg, oral, Daily  FLUoxetine, 40 mg, oral, Daily  hydrOXYzine HCL, 100 mg, oral, Nightly  levothyroxine, 50 mcg, oral, Daily  lisinopril, 10 mg, oral, Daily  nicotine, 1 patch, transdermal, Daily  traZODone, 100 mg, oral, Nightly      Continuous medications     PRN medications  PRN medications: diphenhydrAMINE **OR** diphenhydrAMINE, haloperidol **OR** haloperidol lactate, haloperidol **OR** haloperidol lactate, hydrOXYzine HCL, ibuprofen, LORazepam **OR** LORazepam, nicotine polacrilex, polyethylene glycol   Medications Discontinued During This Encounter   Medication Reason    levothyroxine (Synthroid, Levoxyl) tablet 50 mcg     nicotine (Nicoderm CQ) 21 mg/24 hr patch 1 patch     nicotine (Nicoderm CQ) 14 mg/24 hr patch 1 patch     nicotine (Nicoderm CQ) 7 mg/24 hr patch 1 patch     nicotine polacrilex (Nicorette) gum 2 mg     nicotine (Nicoderm CQ) 14 mg/24 hr patch 1 patch     FLUoxetine (PROzac) capsule 20 mg     traZODone (Desyrel) tablet 100 mg     amphetamine-dextroamphetamine (Adderall) tablet 30 mg     traZODone (Desyrel) tablet 150 mg     LORazepam (Ativan) tablet 2 mg     LORazepam (Ativan) injection 2 mg     QUEtiapine (SEROquel) tablet 50 mg           Labs:  Results for orders placed or performed during the hospital encounter of 01/27/24 (from the past 96 hour(s))   Glucose, Fasting   Result Value Ref Range    Glucose, Fasting 90 74 - 99 mg/dL   Lipid Panel  "  Result Value Ref Range    Cholesterol 206 (H) 0 - 199 mg/dL    HDL-Cholesterol 70.0 mg/dL    Cholesterol/HDL Ratio 2.9     LDL Calculated 111 (H) <=99 mg/dL    VLDL 25 0 - 40 mg/dL    Triglycerides 127 0 - 149 mg/dL    Non HDL Cholesterol 136 0 - 149 mg/dL          Assessment Plan of Care:  Rule out bipolar disorder  Rule out axis 2 b cluster   Major Depression, severe, without psychosis  Alcohol use disorder, relapse, break in sobriety with intoxication, complication (homicidal statement)  Nicotine Use Disorder  ADD  Homicidal statements texts reported by the Tara ruiz, documented in the er note  Guns in the home    Agreed to change trazodone to an alternative sleep aid, will trial seroquel 50mg at bedtime for mood, lability, obsessive perseveration, anxiety and sleep.  Patient has not agreed to sign in, risk assessment completed today, Xochitl has not safety concerns for the patient to come home this week  Referral is being made to [facility] for dual diagnosis, therapy, psychiatry  1/30/24:  Restart trazodone 100mg at bedtime +atarax 100mg at bedtime and reassess in am  Dc Seroquel  Social work will call girlfriend, on my request, patient deemed not to have capacity regarding this decision, has been making threatening text messages to his parents, both patient and girlfriend as well as parents are aware of. Understands discharge will potentially be delayed until fire arms are removed from the home, patient gave verbal consent to [facility] our , he said he will call the joseph, we will be calling Tara mehta to follow up this afternoon. Patient does not feel he has a drinking issues, states its only a \"crutch\", is declining dual dx referral or counseling, at this time, Tara ruiz is aware and was asked to encourage patient to atttend follow up appts for establishing mental health care on discharge.      I spent 6 minutes in the professional and overall care of this patient.      Jody Velasquez MD          "

## 2024-01-30 NOTE — CARE PLAN
"Patient out on the unit and social with peers this shift. Pt attended groups throughout shift. Rated anxiety 8/10 and depression 8/10. Denied SI/HI. Denied auditory/visual/other hallucinations. No complaints of pain or discomfort. Medication compliant. Able to state positive coping skills such as \"focusing on the positives\". The patient's goals for the shift include \"to leave this place\". Q15 minute checks to be maintained throughout shift for safety.       "

## 2024-01-31 VITALS
OXYGEN SATURATION: 99 % | TEMPERATURE: 97.3 F | RESPIRATION RATE: 18 BRPM | HEIGHT: 72 IN | WEIGHT: 159.17 LBS | SYSTOLIC BLOOD PRESSURE: 155 MMHG | BODY MASS INDEX: 21.56 KG/M2 | DIASTOLIC BLOOD PRESSURE: 91 MMHG | HEART RATE: 75 BPM

## 2024-01-31 LAB
C TRACH RRNA SPEC QL NAA+PROBE: NEGATIVE
N GONORRHOEA DNA SPEC QL PROBE+SIG AMP: NEGATIVE

## 2024-01-31 PROCEDURE — 2500000001 HC RX 250 WO HCPCS SELF ADMINISTERED DRUGS (ALT 637 FOR MEDICARE OP): Performed by: PSYCHIATRY & NEUROLOGY

## 2024-01-31 PROCEDURE — S4991 NICOTINE PATCH NONLEGEND: HCPCS | Performed by: NURSE PRACTITIONER

## 2024-01-31 PROCEDURE — 99239 HOSP IP/OBS DSCHRG MGMT >30: CPT | Performed by: PSYCHIATRY & NEUROLOGY

## 2024-01-31 PROCEDURE — 2500000001 HC RX 250 WO HCPCS SELF ADMINISTERED DRUGS (ALT 637 FOR MEDICARE OP): Performed by: NURSE PRACTITIONER

## 2024-01-31 PROCEDURE — 2500000002 HC RX 250 W HCPCS SELF ADMINISTERED DRUGS (ALT 637 FOR MEDICARE OP, ALT 636 FOR OP/ED): Performed by: NURSE PRACTITIONER

## 2024-01-31 PROCEDURE — 87800 DETECT AGNT MULT DNA DIREC: CPT | Mod: GEALAB | Performed by: NURSE PRACTITIONER

## 2024-01-31 PROCEDURE — 97150 GROUP THERAPEUTIC PROCEDURES: CPT | Mod: GO,CO

## 2024-01-31 RX ORDER — FLUOXETINE HYDROCHLORIDE 40 MG/1
40 CAPSULE ORAL DAILY
Qty: 30 CAPSULE | Refills: 0 | OUTPATIENT
Start: 2024-02-01 | End: 2024-03-02

## 2024-01-31 RX ORDER — LEVOTHYROXINE SODIUM 50 UG/1
50 TABLET ORAL DAILY
Qty: 30 TABLET | Refills: 0 | OUTPATIENT
Start: 2024-02-01 | End: 2024-03-02

## 2024-01-31 RX ORDER — LISINOPRIL 10 MG/1
10 TABLET ORAL DAILY
Qty: 30 TABLET | Refills: 0 | Status: SHIPPED | OUTPATIENT
Start: 2024-02-01 | End: 2024-03-02

## 2024-01-31 RX ORDER — HYDROXYZINE HYDROCHLORIDE 50 MG/1
TABLET, FILM COATED ORAL
Qty: 120 TABLET | Refills: 0 | Status: SHIPPED | OUTPATIENT
Start: 2024-01-31

## 2024-01-31 RX ORDER — DEXTROAMPHETAMINE SACCHARATE, AMPHETAMINE ASPARTATE, DEXTROAMPHETAMINE SULFATE AND AMPHETAMINE SULFATE 2.5; 2.5; 2.5; 2.5 MG/1; MG/1; MG/1; MG/1
30 TABLET ORAL DAILY
Qty: 42 TABLET | Refills: 0 | Status: SHIPPED | OUTPATIENT
Start: 2024-02-01 | End: 2024-02-15

## 2024-01-31 RX ORDER — FLUOXETINE HYDROCHLORIDE 40 MG/1
40 CAPSULE ORAL DAILY
Qty: 30 CAPSULE | Refills: 0 | Status: SHIPPED | OUTPATIENT
Start: 2024-02-01 | End: 2024-03-02

## 2024-01-31 RX ORDER — HYDROXYZINE HYDROCHLORIDE 50 MG/1
TABLET, FILM COATED ORAL
Qty: 120 TABLET | Refills: 0 | OUTPATIENT
Start: 2024-01-31

## 2024-01-31 RX ORDER — DEXTROAMPHETAMINE SACCHARATE, AMPHETAMINE ASPARTATE, DEXTROAMPHETAMINE SULFATE AND AMPHETAMINE SULFATE 7.5; 7.5; 7.5; 7.5 MG/1; MG/1; MG/1; MG/1
30 TABLET ORAL DAILY
Qty: 30 TABLET | Refills: 0 | OUTPATIENT
Start: 2024-01-31 | End: 2024-03-01

## 2024-01-31 RX ORDER — TRAZODONE HYDROCHLORIDE 100 MG/1
100 TABLET ORAL NIGHTLY
Qty: 30 TABLET | Refills: 0 | OUTPATIENT
Start: 2024-01-31 | End: 2024-03-01

## 2024-01-31 RX ORDER — IBUPROFEN 200 MG
1 TABLET ORAL DAILY
Qty: 30 PATCH | Refills: 0 | Status: SHIPPED | OUTPATIENT
Start: 2024-02-01 | End: 2024-03-02

## 2024-01-31 RX ORDER — IBUPROFEN 200 MG
1 TABLET ORAL DAILY
Qty: 30 PATCH | Refills: 0 | OUTPATIENT
Start: 2024-02-01 | End: 2024-03-02

## 2024-01-31 RX ORDER — LEVOTHYROXINE SODIUM 50 UG/1
50 TABLET ORAL DAILY
Qty: 30 TABLET | Refills: 0 | Status: SHIPPED | OUTPATIENT
Start: 2024-02-01 | End: 2024-03-02

## 2024-01-31 RX ORDER — TRAZODONE HYDROCHLORIDE 100 MG/1
100 TABLET ORAL NIGHTLY
Qty: 30 TABLET | Refills: 0 | Status: SHIPPED | OUTPATIENT
Start: 2024-01-31 | End: 2024-03-01

## 2024-01-31 RX ADMIN — FLUOXETINE 40 MG: 20 CAPSULE ORAL at 09:10

## 2024-01-31 RX ADMIN — LISINOPRIL 10 MG: 5 TABLET ORAL at 09:10

## 2024-01-31 RX ADMIN — LEVOTHYROXINE SODIUM 50 MCG: 50 TABLET ORAL at 06:22

## 2024-01-31 RX ADMIN — NICOTINE 1 PATCH: 21 PATCH, EXTENDED RELEASE TRANSDERMAL at 09:10

## 2024-01-31 RX ADMIN — HYDROXYZINE HYDROCHLORIDE 50 MG: 25 TABLET ORAL at 10:08

## 2024-01-31 RX ADMIN — NICOTINE POLACRILEX 4 MG: 2 GUM, CHEWING BUCCAL at 10:08

## 2024-01-31 RX ADMIN — DEXTROAMPHETAMINE SACCHARATE, AMPHETAMINE ASPARTATE, DEXTROAMPHETAMINE SULFATE AND AMPHETAMINE SULFATE 30 MG: 2.5; 2.5; 2.5; 2.5 TABLET ORAL at 09:10

## 2024-01-31 ASSESSMENT — PAIN - FUNCTIONAL ASSESSMENT: PAIN_FUNCTIONAL_ASSESSMENT: 0-10

## 2024-01-31 ASSESSMENT — PAIN SCALES - GENERAL: PAINLEVEL_OUTOF10: 0 - NO PAIN

## 2024-01-31 NOTE — CARE PLAN
"Patient out on the unit and social with peers this shift. Rated anxiety 3/10 and depression 1/10. Denied SI/HI. Denied auditory/visual/other hallucinations. No complaints of pain or discomfort. Medication compliant. Able to state positive coping skills such as \"taking a step back\". The patient's goals for the shift include \"make a realistic plan for my future\". Q15 minute checks to be maintained throughout shift for safety.       "

## 2024-01-31 NOTE — PROGRESS NOTES
"Occupational Therapy     REHAB Therapy Assessment & Treatment    Patient Name: Teo Acevedo  MRN: [id number]  Today's Date: 1/31/2024      Activity Assessment:     Boundaries/ Importance and Identification Group: [id number]  Meaningful Sharing/ Healthy Habits Group: [id number]  Music as a Coping Tool Group: [id number]    3/3 Groups attended     Pt present and participates in all groups presenting with increased guarded behaviors and less initiation to share as observed previously. Pt appears irritable at times, but cooperative. Pt does not share understanding of boundary education however, is able to complete creating his own needed boundaries to improve quality of life such as \"I am not lying or exaggerating about my memories. I will not listen to people who do not listen to me, and I do not want to listen to peoples opinions about what they think I should do\" Pt declines to share same aloud when offered. During meaningful sharing, pt with slightly improved engagement as well as able to ID X2 appropriate habits he currently engages in. Pt with fluctuating mood and impaired insight at times, making progress gradual/F this date as evidenced above. Pt would benefit from continued OT services in order to improve overall self-esteem, personal confidence and supports with increased awareness for safe transition location at discharge.        Encounter Problems       Encounter Problems (Active)       OT Goals       Pt will ID 3 relapse prevention strategies to employ after D/C to improve daily function  (Progressing)       Start:  01/29/24    Expected End:  02/26/24            Pt will ID 2 community resources/programs to join/attend after D/C to improve their support system  (Progressing)       Start:  01/29/24    Expected End:  02/26/24            Pt will ID 2-3 effective ways to distract from crisis and ID stressors/ personal symptoms of stress in order to improve management of stress during daily activities.   (Progressing) "       Start:  01/29/24    Expected End:  02/26/24            Pt will explore and ID 1-2 strategies to manage stressors/symptoms of illness/ grief more effectively prior to discharge.   (Progressing)       Start:  01/29/24    Expected End:  02/26/24            Pt will improve ability to ID and express emotions while accepting and tolerating all emotions, in order to increase awareness of positive emotions.   (Progressing)       Start:  01/29/24    Expected End:  02/26/24                       Additional Comments:  BAL collaborated with patients nurse and charge nurse throughout the day to provide the appropriate support and encouragement to attend groups. Pt up on unit when BAL left last group of the day. All needs met.

## 2024-01-31 NOTE — CARE PLAN
Nithya talked to Carlos's girlfriend, Tara Abbott, who stated she removed the guns from patient's home, that patient has no access and she will not give them back until Psychiatrist at [facility] gives the ok.  Girlfriend agreed to come at 12 noon today to  patient for discharge; He is stabilized. He will follow up with [facility] at discharge as is noted in the After Care Summary.

## 2024-01-31 NOTE — CARE PLAN
"  Problem: Potential for Harm to Self or Others  Goal: Participates in unit activities  Outcome: Progressing     Problem: Alteration in Sleep  Goal: STG - Reports nightly sleep, duration, and quality  Outcome: Progressing     Problem: Self Care Deficit  Goal: Accepts need for medications  Outcome: Progressing   The patient's goals for the shift include \"Get some sleep\"    The clinical goals for the shift include maintain safety    Over the shift, the patient did make progress toward the following goals.     "
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
## 2024-01-31 NOTE — NURSING NOTE
"2045- The pt was calm and cooperative during the interview that took place at the end of the cormier away from his peers for privacy. Pt rated his anxiety 6-7/10, depression 6/10, denies SI, HI and AVH at this time as well as pain rating it a 0/10. Last bowel movement was, \"today\" 1/30/24. Coping skills, \"I don't know. Learning to not argue with people over senseless stuff.\" Goal for the evening, \"Get some sleep.\" Pt strengths, \"I think I'm a nice person and I'm motivated.\" The pt was medication compliant with his evening medications. 15 minute rounding continued.     2200- The pt asked this nurse about his discharge and if the sw had spoke to Shaq as she is not answering his calls. I informed him that the sw had spoken to her and she was waiting for him to give Shaq permission to retreive the guns from his house. He stated, \"What the fuck. I did talk to her around 5 tonight and told her to get the guns and now she's not answering my calls. She has to get her kids from school tomorrow when I'm supposed to get picked up from here. Shaq told me I may have to wait a couple hours later till after she picks her kids up. I told her to talk to the sw to see if they can discharge me earlier and she said no. I think she's trying to keep me here. So, I hung up on her and now she's not answering my calls. I called her twice. I know the staff listened to my phone call yesterday because they knew what I said.\" I asked the pt if maybe the staff knew what was said because they spoke to Shaq? He stated, \"No, they told me they heard the conversation.\" I asked the pt if we were able to give him a ride to his car if he would be okay with that, it possibly? He stated, \"Yeah, I'd be fine with that but how am I suppose to get my guns from shaq?\" I explained to the pt the point of Shaq getting the guns was to keep them out of his possession until the appropriate time.\" The pt just stared at this nurse and stated, \"This is fucking " "bullshit. I never gave them permission to talk to Tara. Before I left, I want information on patient advocate because I want answers.\"This nurse informed the pt the staff would not have spoke to her if there was not a release signed to do so. The pt then walked away and was overheard cussing and complaining to his peers about his situation. 15 minute monitoring continued.   "

## 2024-02-01 NOTE — PROGRESS NOTES
"Occupational Therapy     REHAB Therapy Assessment & Treatment    Patient Name: Teo Acevedo  MRN: [id number]  Today's Date: 2/1/2024      Activity Assessment:     Happiness/ Self [place] Group: [id number]  Improving Self Esteem/ Personal Strengths Group: [id number]  Meaningful Leisure Group: [id number]    3/3 Groups attended       Pt present and participates well in all above groups with improved mood, initiation to share his thoughts, and improved insight. Pt with appropriate acknowledgement of \"I was in a bad mood yesterday and I apologize for that\" Pt with G understanding of happiness survey as well as shares aloud \"not being surprised by my answers\" Pt receptive to education of areas he can take responsibility for in order to improve self happiness/quality of life. During self esteem task, pt again with G understanding, sharing how he can be more mindful of his self worth and is able to share aloud X1 personal strength when prompted \"thoughtful\" During leisure task, pt with appropriate attention to task, improved social interaction, and G understanding of reasoning of meaningful tasks as a positive distraction to stress. Pt with G progress toward OT goals this date as evidenced above. Pt would benefit from continued OT services in order to improve overall self-esteem, personal confidence and supports with increased awareness for safe transition location at discharge.         Encounter Problems       Encounter Problems (Active)       OT Goals       Pt will ID 3 relapse prevention strategies to employ after D/C to improve daily function  (Progressing)       Start:  01/29/24    Expected End:  02/26/24            Pt will ID 2 community resources/programs to join/attend after D/C to improve their support system  (Progressing)       Start:  01/29/24    Expected End:  02/26/24            Pt will ID 2-3 effective ways to distract from crisis and ID stressors/ personal symptoms of stress in order to improve management of " stress during daily activities.   (Progressing)       Start:  01/29/24    Expected End:  02/26/24            Pt will explore and ID 1-2 strategies to manage stressors/symptoms of illness/ grief more effectively prior to discharge.   (Progressing)       Start:  01/29/24    Expected End:  02/26/24            Pt will improve ability to ID and express emotions while accepting and tolerating all emotions, in order to increase awareness of positive emotions.   (Progressing)       Start:  01/29/24    Expected End:  02/26/24                           Additional Comments:    BAL collaborated with patients nurse and charge nurse throughout the day to provide the appropriate support and encouragement to attend groups. Pt up on unit when BAL left last group of the day. All needs met.

## 2024-02-01 NOTE — SIGNIFICANT EVENT
Follow Up Phone Call    Outgoing phone call    Spoke to: Teo Acevedo Relationship:self   Phone number: [phone]      Outcome: contacted patient/ family   No chief complaint on file.         Diagnosis:Not applicable      States he is feeling better. Denies suicidal ideation at this time. Has emergency contact numbers on his person. Encouraged compliance with medications and appointments. Voiced understanding. No further questions or concerns.

## 2024-02-05 ENCOUNTER — OFFICE VISIT (OUTPATIENT)
Dept: PRIMARY CARE | Facility: CLINIC | Age: 39
End: 2024-02-05
Payer: MEDICAID

## 2024-02-05 VITALS — RESPIRATION RATE: 14 BRPM | SYSTOLIC BLOOD PRESSURE: 132 MMHG | DIASTOLIC BLOOD PRESSURE: 77 MMHG | HEART RATE: 68 BPM

## 2024-02-05 DIAGNOSIS — I10 PRIMARY HYPERTENSION: ICD-10-CM

## 2024-02-05 DIAGNOSIS — R35.0 URINARY FREQUENCY: ICD-10-CM

## 2024-02-05 DIAGNOSIS — F17.220 CHEWING TOBACCO NICOTINE DEPENDENCE WITHOUT COMPLICATION: ICD-10-CM

## 2024-02-05 DIAGNOSIS — E03.9 HYPOTHYROIDISM, UNSPECIFIED TYPE: Primary | ICD-10-CM

## 2024-02-05 DIAGNOSIS — R73.09 ELEVATED RANDOM BLOOD GLUCOSE LEVEL: ICD-10-CM

## 2024-02-05 PROBLEM — R73.9 ELEVATED RANDOM BLOOD GLUCOSE LEVEL: Status: ACTIVE | Noted: 2024-02-05

## 2024-02-05 PROCEDURE — 4004F PT TOBACCO SCREEN RCVD TLK: CPT | Performed by: FAMILY MEDICINE

## 2024-02-05 PROCEDURE — 3075F SYST BP GE 130 - 139MM HG: CPT | Performed by: FAMILY MEDICINE

## 2024-02-05 PROCEDURE — 3078F DIAST BP <80 MM HG: CPT | Performed by: FAMILY MEDICINE

## 2024-02-05 PROCEDURE — 99204 OFFICE O/P NEW MOD 45 MIN: CPT | Performed by: FAMILY MEDICINE

## 2024-02-05 RX ORDER — IBUPROFEN 200 MG
1 TABLET ORAL EVERY 24 HOURS
Qty: 30 PATCH | Refills: 0 | Status: SHIPPED | OUTPATIENT
Start: 2024-02-05 | End: 2024-03-06

## 2024-02-05 NOTE — PROGRESS NOTES
Subjective   Patient ID: Teo Acevedo is a 38 y.o. male who presents for est pcp    HPI   pt denies having fatigue, cold or heat intolerance while on current dose of  Levoxyl. No vision change, cp, heart palpitation or LE edema. No HA, dizziness, dry skin or constipation. No wt changes. Pt sleeps well. No depressed mood. pt noticed urinary frequency and urgency lately.  Pt denies low abd tenderness, dysuria.  no hematuria, flank pain, fever or chills. No nausea, vomiting.  Normal appetite. Patient has been compliant with taking all  current medications. Pt had occ polyuria but no  significant weight changes. No LE numbness or tingling. No blurry vision.     Review of Systems    Objective   /77   Pulse 68   Resp 14     Physical Exam  NAD, well groomed, eyes: , No sclera icterus. neck: supple, no cervical lymphadenopathy, no thyroid tenderness, nodules or enlargement.  lungs: CTA b/l, heart: RRR, no LE edema, abd: soft, no tenderness, BS+, normal strength and sensation  at bilateral lower extremities. DTR were normal at knees, No dry skin or dry hair. No tremor. Good balance.   Assessment/Plan   Problem List Items Addressed This Visit             ICD-10-CM    HTN (hypertension) I10     BP has been controlled. Continue BP pills.  DASH diet and regular exercise. Fu in 3 mos         Hypothyroidism - Primary E03.9     Hypothyroidism, asymptomatic. Check TSH for evaluation. Will adjust dose of levothyroxine depending on the TSH level.           Relevant Orders    TSH with reflex to Free T4 if abnormal    Elevated random blood glucose level R73.09     Check A1c and bmp for eval         Relevant Orders    Basic Metabolic Panel    Hemoglobin A1C    Urinary frequency R35.0     Check ua and cx         Relevant Orders    Urinalysis with Reflex Microscopic    Urine Culture    Chewing tobacco nicotine dependence without complication F17.220     Cont nicotine patch as dir.          Relevant Medications    nicotine (Nicoderm  CQ) 14 mg/24 hr patch

## 2024-03-13 ENCOUNTER — LAB (OUTPATIENT)
Dept: LAB | Facility: LAB | Age: 39
End: 2024-03-13
Payer: MEDICAID

## 2024-03-13 DIAGNOSIS — R35.0 URINARY FREQUENCY: ICD-10-CM

## 2024-03-13 DIAGNOSIS — R73.09 ELEVATED RANDOM BLOOD GLUCOSE LEVEL: ICD-10-CM

## 2024-03-13 DIAGNOSIS — E03.9 HYPOTHYROIDISM, UNSPECIFIED TYPE: ICD-10-CM

## 2024-03-13 LAB
ANION GAP SERPL CALC-SCNC: 11 MMOL/L (ref 10–20)
APPEARANCE UR: CLEAR
BILIRUB UR STRIP.AUTO-MCNC: NEGATIVE MG/DL
BUN SERPL-MCNC: 13 MG/DL (ref 6–23)
CALCIUM SERPL-MCNC: 9.1 MG/DL (ref 8.6–10.3)
CHLORIDE SERPL-SCNC: 100 MMOL/L (ref 98–107)
CO2 SERPL-SCNC: 30 MMOL/L (ref 21–32)
COLOR UR: YELLOW
CREAT SERPL-MCNC: 0.87 MG/DL (ref 0.5–1.3)
EGFRCR SERPLBLD CKD-EPI 2021: >90 ML/MIN/1.73M*2
GLUCOSE SERPL-MCNC: 101 MG/DL (ref 74–99)
GLUCOSE UR STRIP.AUTO-MCNC: NEGATIVE MG/DL
KETONES UR STRIP.AUTO-MCNC: NEGATIVE MG/DL
LEUKOCYTE ESTERASE UR QL STRIP.AUTO: NEGATIVE
NITRITE UR QL STRIP.AUTO: NEGATIVE
PH UR STRIP.AUTO: 5 [PH]
POTASSIUM SERPL-SCNC: 4.5 MMOL/L (ref 3.5–5.3)
PROT UR STRIP.AUTO-MCNC: NEGATIVE MG/DL
RBC # UR STRIP.AUTO: NEGATIVE /UL
SODIUM SERPL-SCNC: 136 MMOL/L (ref 136–145)
SP GR UR STRIP.AUTO: 1.02
TSH SERPL-ACNC: 0.61 MIU/L (ref 0.44–3.98)
UROBILINOGEN UR STRIP.AUTO-MCNC: <2 MG/DL

## 2024-03-13 PROCEDURE — 83036 HEMOGLOBIN GLYCOSYLATED A1C: CPT

## 2024-03-13 PROCEDURE — 36415 COLL VENOUS BLD VENIPUNCTURE: CPT

## 2024-03-13 PROCEDURE — 81003 URINALYSIS AUTO W/O SCOPE: CPT

## 2024-03-13 PROCEDURE — 80048 BASIC METABOLIC PNL TOTAL CA: CPT

## 2024-03-13 PROCEDURE — 84443 ASSAY THYROID STIM HORMONE: CPT

## 2024-03-14 LAB
EST. AVERAGE GLUCOSE BLD GHB EST-MCNC: 143 MG/DL
HBA1C MFR BLD: 6.6 %

## 2024-03-19 ENCOUNTER — OFFICE VISIT (OUTPATIENT)
Dept: PRIMARY CARE | Facility: CLINIC | Age: 39
End: 2024-03-19
Payer: MEDICAID

## 2024-03-19 VITALS — HEART RATE: 68 BPM | SYSTOLIC BLOOD PRESSURE: 125 MMHG | DIASTOLIC BLOOD PRESSURE: 76 MMHG

## 2024-03-19 DIAGNOSIS — E11.9 TYPE 2 DIABETES MELLITUS WITHOUT COMPLICATION, WITHOUT LONG-TERM CURRENT USE OF INSULIN (MULTI): Primary | ICD-10-CM

## 2024-03-19 PROCEDURE — 3078F DIAST BP <80 MM HG: CPT | Performed by: FAMILY MEDICINE

## 2024-03-19 PROCEDURE — 3044F HG A1C LEVEL LT 7.0%: CPT | Performed by: FAMILY MEDICINE

## 2024-03-19 PROCEDURE — 3074F SYST BP LT 130 MM HG: CPT | Performed by: FAMILY MEDICINE

## 2024-03-19 PROCEDURE — 99213 OFFICE O/P EST LOW 20 MIN: CPT | Performed by: FAMILY MEDICINE

## 2024-03-19 PROCEDURE — 3049F LDL-C 100-129 MG/DL: CPT | Performed by: FAMILY MEDICINE

## 2024-03-19 PROCEDURE — 4004F PT TOBACCO SCREEN RCVD TLK: CPT | Performed by: FAMILY MEDICINE

## 2024-03-19 NOTE — PROGRESS NOTES
Subjective   Patient ID: Teo Acevedo is a 38 y.o. male who presents for lab test    HPI   Patient has had  polydipsia, polyuria but no  significant weight changes lately. No lower extremities skin lesion. No LE numbness or tingling. Pt had occ  blurry vision. No HA, dizziness, heart palpitation. No  LE edema. No imbalance or falls.      Review of Systems    Objective   /76   Pulse 68     Physical Exam  NAD, well groomed, No sclera icterus.   lungs: CTA b/l, heart: RRR, No LE edema, normal pedal pulses, abd: soft, no tenderness, BS+, normal strength and sensation  at bilateral lower extremities. No skin lesions at bilateral feet.  Good balance.   Assessment/Plan     DMII, new onset. Pt declined to try medications. Will monitor  A1C, urine albumin. advise eye exam by an OD yearly and checking bilateral feet for skin lesions qhs. Healthy diet and regular exercise. Fu in 3 mos

## 2024-03-25 ENCOUNTER — APPOINTMENT (OUTPATIENT)
Dept: PRIMARY CARE | Facility: CLINIC | Age: 39
End: 2024-03-25
Payer: MEDICAID

## 2024-05-06 ENCOUNTER — APPOINTMENT (OUTPATIENT)
Dept: PRIMARY CARE | Facility: CLINIC | Age: 39
End: 2024-05-06
Payer: MEDICAID

## 2024-06-19 ENCOUNTER — APPOINTMENT (OUTPATIENT)
Dept: PRIMARY CARE | Facility: CLINIC | Age: 39
End: 2024-06-19
Payer: MEDICAID

## 2024-11-08 ENCOUNTER — LAB (OUTPATIENT)
Dept: LAB | Facility: LAB | Age: 39
End: 2024-11-08
Payer: MEDICAID

## 2024-11-08 DIAGNOSIS — Z79.899 OTHER LONG TERM (CURRENT) DRUG THERAPY: Primary | ICD-10-CM

## 2024-11-08 LAB
25(OH)D3 SERPL-MCNC: 23 NG/ML (ref 30–100)
ALBUMIN SERPL BCP-MCNC: 4.6 G/DL (ref 3.4–5)
ALP SERPL-CCNC: 56 U/L (ref 33–120)
ALT SERPL W P-5'-P-CCNC: 13 U/L (ref 10–52)
ANION GAP SERPL CALC-SCNC: 12 MMOL/L (ref 10–20)
AST SERPL W P-5'-P-CCNC: 15 U/L (ref 9–39)
BASOPHILS # BLD AUTO: 0.05 X10*3/UL (ref 0–0.1)
BASOPHILS NFR BLD AUTO: 0.6 %
BILIRUB SERPL-MCNC: 0.3 MG/DL (ref 0–1.2)
BUN SERPL-MCNC: 21 MG/DL (ref 6–23)
CALCIUM SERPL-MCNC: 9.3 MG/DL (ref 8.6–10.3)
CHLORIDE SERPL-SCNC: 102 MMOL/L (ref 98–107)
CHOLEST SERPL-MCNC: 267 MG/DL (ref 0–199)
CHOLESTEROL/HDL RATIO: 4
CO2 SERPL-SCNC: 30 MMOL/L (ref 21–32)
CREAT SERPL-MCNC: 1 MG/DL (ref 0.5–1.3)
EGFRCR SERPLBLD CKD-EPI 2021: >90 ML/MIN/1.73M*2
EOSINOPHIL # BLD AUTO: 0.15 X10*3/UL (ref 0–0.7)
EOSINOPHIL NFR BLD AUTO: 1.8 %
ERYTHROCYTE [DISTWIDTH] IN BLOOD BY AUTOMATED COUNT: 14.8 % (ref 11.5–14.5)
EST. AVERAGE GLUCOSE BLD GHB EST-MCNC: 117 MG/DL
GLUCOSE SERPL-MCNC: 87 MG/DL (ref 74–99)
HBA1C MFR BLD: 5.7 %
HCT VFR BLD AUTO: 48.4 % (ref 41–52)
HDLC SERPL-MCNC: 66.6 MG/DL
HGB BLD-MCNC: 15.2 G/DL (ref 13.5–17.5)
IMM GRANULOCYTES # BLD AUTO: 0.02 X10*3/UL (ref 0–0.7)
IMM GRANULOCYTES NFR BLD AUTO: 0.2 % (ref 0–0.9)
IRON SATN MFR SERPL: 20 % (ref 25–45)
IRON SERPL-MCNC: 76 UG/DL (ref 35–150)
LDLC SERPL CALC-MCNC: 142 MG/DL
LYMPHOCYTES # BLD AUTO: 2.55 X10*3/UL (ref 1.2–4.8)
LYMPHOCYTES NFR BLD AUTO: 30.9 %
MCH RBC QN AUTO: 29.2 PG (ref 26–34)
MCHC RBC AUTO-ENTMCNC: 31.4 G/DL (ref 32–36)
MCV RBC AUTO: 93 FL (ref 80–100)
MONOCYTES # BLD AUTO: 0.55 X10*3/UL (ref 0.1–1)
MONOCYTES NFR BLD AUTO: 6.7 %
NEUTROPHILS # BLD AUTO: 4.94 X10*3/UL (ref 1.2–7.7)
NEUTROPHILS NFR BLD AUTO: 59.8 %
NON HDL CHOLESTEROL: 200 MG/DL (ref 0–149)
NRBC BLD-RTO: 0 /100 WBCS (ref 0–0)
PLATELET # BLD AUTO: 365 X10*3/UL (ref 150–450)
POTASSIUM SERPL-SCNC: 5 MMOL/L (ref 3.5–5.3)
PROT SERPL-MCNC: 6.8 G/DL (ref 6.4–8.2)
RBC # BLD AUTO: 5.2 X10*6/UL (ref 4.5–5.9)
SODIUM SERPL-SCNC: 139 MMOL/L (ref 136–145)
T3FREE SERPL-MCNC: 2.8 PG/ML (ref 2.3–4.2)
TIBC SERPL-MCNC: 383 UG/DL (ref 240–445)
TRIGL SERPL-MCNC: 291 MG/DL (ref 0–149)
TSH SERPL-ACNC: 4.67 MIU/L (ref 0.44–3.98)
UIBC SERPL-MCNC: 307 UG/DL (ref 110–370)
VIT B12 SERPL-MCNC: 215 PG/ML (ref 211–911)
VLDL: 58 MG/DL (ref 0–40)
WBC # BLD AUTO: 8.3 X10*3/UL (ref 4.4–11.3)

## 2024-11-08 PROCEDURE — 84443 ASSAY THYROID STIM HORMONE: CPT

## 2024-11-08 PROCEDURE — 83036 HEMOGLOBIN GLYCOSYLATED A1C: CPT

## 2024-11-08 PROCEDURE — 84481 FREE ASSAY (FT-3): CPT

## 2024-11-08 PROCEDURE — 82306 VITAMIN D 25 HYDROXY: CPT

## 2024-11-08 PROCEDURE — 83550 IRON BINDING TEST: CPT

## 2024-11-08 PROCEDURE — 80061 LIPID PANEL: CPT

## 2024-11-08 PROCEDURE — 80053 COMPREHEN METABOLIC PANEL: CPT

## 2024-11-08 PROCEDURE — 36415 COLL VENOUS BLD VENIPUNCTURE: CPT

## 2024-11-08 PROCEDURE — 85025 COMPLETE CBC W/AUTO DIFF WBC: CPT

## 2024-11-08 PROCEDURE — 83540 ASSAY OF IRON: CPT

## 2024-11-08 PROCEDURE — 82607 VITAMIN B-12: CPT
